# Patient Record
Sex: FEMALE | Race: WHITE | NOT HISPANIC OR LATINO | Employment: OTHER | URBAN - METROPOLITAN AREA
[De-identification: names, ages, dates, MRNs, and addresses within clinical notes are randomized per-mention and may not be internally consistent; named-entity substitution may affect disease eponyms.]

---

## 2022-07-19 ENCOUNTER — TELEPHONE (OUTPATIENT)
Dept: PAIN MEDICINE | Facility: CLINIC | Age: 87
End: 2022-07-19

## 2022-07-22 RX ORDER — AMLODIPINE BESYLATE 10 MG/1
10 TABLET ORAL DAILY
COMMUNITY
Start: 2022-06-18

## 2022-07-22 RX ORDER — HYDROCHLOROTHIAZIDE 25 MG/1
25 TABLET ORAL DAILY
COMMUNITY
Start: 2022-06-18

## 2022-07-25 ENCOUNTER — CONSULT (OUTPATIENT)
Dept: PAIN MEDICINE | Facility: CLINIC | Age: 87
End: 2022-07-25
Payer: COMMERCIAL

## 2022-07-25 VITALS — SYSTOLIC BLOOD PRESSURE: 173 MMHG | HEART RATE: 83 BPM | WEIGHT: 163.8 LBS | DIASTOLIC BLOOD PRESSURE: 79 MMHG

## 2022-07-25 DIAGNOSIS — M50.120 CERVICAL DISC DISORDER WITH RADICULOPATHY OF MID-CERVICAL REGION: ICD-10-CM

## 2022-07-25 DIAGNOSIS — M19.012 PRIMARY OSTEOARTHRITIS OF LEFT SHOULDER: ICD-10-CM

## 2022-07-25 DIAGNOSIS — M48.02 CERVICAL SPINAL STENOSIS: ICD-10-CM

## 2022-07-25 DIAGNOSIS — G89.29 CHRONIC LEFT SHOULDER PAIN: ICD-10-CM

## 2022-07-25 DIAGNOSIS — M75.122 NONTRAUMATIC COMPLETE TEAR OF LEFT ROTATOR CUFF: ICD-10-CM

## 2022-07-25 DIAGNOSIS — G89.4 CHRONIC PAIN SYNDROME: Primary | ICD-10-CM

## 2022-07-25 DIAGNOSIS — M25.512 CHRONIC LEFT SHOULDER PAIN: ICD-10-CM

## 2022-07-25 PROBLEM — M54.12 CERVICAL RADICULOPATHY: Status: ACTIVE | Noted: 2022-01-20

## 2022-07-25 PROBLEM — J45.20 MILD INTERMITTENT ASTHMA WITHOUT COMPLICATION: Status: ACTIVE | Noted: 2017-08-22

## 2022-07-25 PROBLEM — R94.31 PROLONGED QT INTERVAL: Status: ACTIVE | Noted: 2019-04-02

## 2022-07-25 PROBLEM — I10 ESSENTIAL HYPERTENSION: Status: ACTIVE | Noted: 2021-10-06

## 2022-07-25 PROBLEM — Z96.651 H/O TOTAL KNEE REPLACEMENT, RIGHT: Status: ACTIVE | Noted: 2019-03-04

## 2022-07-25 PROCEDURE — 20611 DRAIN/INJ JOINT/BURSA W/US: CPT | Performed by: ANESTHESIOLOGY

## 2022-07-25 PROCEDURE — 99204 OFFICE O/P NEW MOD 45 MIN: CPT | Performed by: ANESTHESIOLOGY

## 2022-07-25 RX ORDER — BUPIVACAINE HYDROCHLORIDE 2.5 MG/ML
10 INJECTION, SOLUTION EPIDURAL; INFILTRATION; INTRACAUDAL ONCE
Status: COMPLETED | OUTPATIENT
Start: 2022-07-25 | End: 2022-07-25

## 2022-07-25 RX ORDER — ALBUTEROL SULFATE 90 UG/1
2 AEROSOL, METERED RESPIRATORY (INHALATION) EVERY 6 HOURS PRN
COMMUNITY
Start: 2021-10-12 | End: 2022-10-12

## 2022-07-25 RX ORDER — ASPIRIN 81 MG/1
1 TABLET ORAL AS NEEDED
COMMUNITY

## 2022-07-25 RX ORDER — METHYLPREDNISOLONE ACETATE 40 MG/ML
40 INJECTION, SUSPENSION INTRA-ARTICULAR; INTRALESIONAL; INTRAMUSCULAR; SOFT TISSUE ONCE
Status: COMPLETED | OUTPATIENT
Start: 2022-07-25 | End: 2022-07-25

## 2022-07-25 RX ADMIN — METHYLPREDNISOLONE ACETATE 40 MG: 40 INJECTION, SUSPENSION INTRA-ARTICULAR; INTRALESIONAL; INTRAMUSCULAR; SOFT TISSUE at 08:34

## 2022-07-25 RX ADMIN — BUPIVACAINE HYDROCHLORIDE 10 ML: 2.5 INJECTION, SOLUTION EPIDURAL; INFILTRATION; INTRACAUDAL at 08:34

## 2022-07-25 NOTE — PROGRESS NOTES
Pre-procedure Diagnosis:  Chronic left shoulder pain    Post-procedure Diagnosis:  Chronic left shoulder pain    Operation Title(s):  Left subacromial bursa injection under ultrasound guidance  Attending Surgeon:   Erin Mondragon MD  Anesthesia:   Local    Indications: The patient is a 81y o  year-old female with a diagnosis of chronic left shoulder pain  The patient's history and physical exam were reviewed  The risks, benefits and alternatives to the procedure were discussed, and all questions were answered to the patient's satisfaction  The patient agreed to proceed, and written informed consent was obtained  Procedure in Detail: The patient was brought into the exam room and placed in the sitting position on the exam room chair  The left shoulder was prepped with chloraprep and draped in a sterile manner  A sterile ultrasound probe cover and gel was placed over a 3 75 MHz curvilinear transducer probe  The probe was placed over the shoulder to visualize the subdeltoid/subacromial bursal region  Optimal needle path was determined and the skin and subcutaneous tissues in the area was anesthetized with 1% lidocaine  Then, under ultrasound guidance, a 2 inch ultrasound needle was advanced until the needle entered the bursa region  After visualization of the tip in the target area and negative aspiration for blood, a solution consisting of 3 mL 0 25% bupivacaine and 1 mL Depo-Medrol (40mg/ml) was easily injected  The patient's shoulder was cleansed and a bandage was placed over the sites of needle insertion  Disposition: The patient tolerated the procedure well and there were no apparent complications  The patient was given written discharge instructions for the procedure

## 2022-07-25 NOTE — PROGRESS NOTES
Assessment  1  Chronic pain syndrome    2  Primary osteoarthritis of left shoulder    3  Chronic left shoulder pain    4  Nontraumatic complete tear of left rotator cuff    5  Cervical spinal stenosis    6  Cervical disc disorder with radiculopathy of mid-cervical region        Plan  The patient's symptoms, history/physical are consistent with pain that is multifactorial in origin but predominantly the result of her severe left shoulder osteoarthritis and rotator cuff tear in conjunction with multilevel cervical spinal stenosis which is leading to left radicular symptoms  At this time, I discussed treatment that will be multimodal in approach  We will perform a left shoulder subacromial bursa injection to help reduce pain from the shoulder joint  Please see the separate procedure note  I did discuss that if she is still symptomatic I will likely recommend repeating the cervical epidural steroid injection but I will use a particulate steroid this time which should provide more long-term relief  Complete risks and benefits including bleeding, infection, tissue reaction, nerve injury and allergic reaction were discussed  The approach was demonstrated using models and literature was provided  Verbal and written consent was obtained  My impressions and treatment recommendations were discussed in detail with the patient who verbalized understanding and had no further questions  Discharge instructions were provided  I personally saw and examined the patient and I agree with the above discussed plan of care  No orders of the defined types were placed in this encounter      New Medications Ordered This Visit   Medications    albuterol (PROVENTIL HFA,VENTOLIN HFA) 90 mcg/act inhaler     Sig: Inhale 2 puffs every 6 (six) hours as needed    aspirin (ECOTRIN LOW STRENGTH) 81 mg EC tablet     Sig: Take 1 tablet by mouth daily    cyanocobalamin (VITAMIN B-12) 1000 MCG tablet     Sig: Take 1 tablet by mouth daily    bupivacaine (PF) (MARCAINE) 0 25 % injection 10 mL    methylPREDNISolone acetate (DEPO-MEDROL) injection 40 mg       History of Present Illness    Melani Peres is a 81 y o  female who presents with chronic left shoulder and arm pain that has been present for 2 years  The patient has been following with Dr Macy Lujan at [facility] and has a known history of severe glenohumeral osteoarthritis as well as complete tear of the left rotator cuff and cervical spinal stenosis Symptoms are severe rated 10/10 on a numeric rating scale and felt constantly  Symptoms include numbness and paresthesias down the entire left arm with weakness  Pain symptoms are aggravated any movement of the left arm  Treatment history has included left shoulder surgery 2 years ago which provided no relief  Nerve injection provided moderate relief  Ice provides moderate relief  Some relief with Tylenol  I have personally reviewed and/or updated the patient's past medical history, past surgical history, family history, social history, current medications, allergies, and vital signs today  Review of Systems   Constitutional: Negative for fever and unexpected weight change  HENT: Negative for trouble swallowing  Eyes: Negative for visual disturbance  Respiratory: Negative for shortness of breath and wheezing  Cardiovascular: Negative for chest pain and palpitations  Gastrointestinal: Negative for constipation, diarrhea, nausea and vomiting  Endocrine: Negative for cold intolerance, heat intolerance and polydipsia  Genitourinary: Negative for difficulty urinating and frequency  Musculoskeletal: Positive for joint swelling  Negative for arthralgias, gait problem and myalgias  Skin: Negative for rash  Neurological: Positive for weakness and numbness  Negative for dizziness, seizures, syncope and headaches  Hematological: Does not bruise/bleed easily     Psychiatric/Behavioral: Negative for dysphoric mood  All other systems reviewed and are negative  Patient Active Problem List   Diagnosis    Cervical radiculopathy    Essential hypertension    Gastroesophageal reflux disease without esophagitis    H/O total knee replacement, right    Hyperlipidemia    Left shoulder pain    Prolonged QT interval    Mild intermittent asthma without complication    Nontraumatic complete tear of left rotator cuff    Chronic pain syndrome       No past medical history on file  No past surgical history on file  No family history on file  Social History     Occupational History    Not on file   Tobacco Use    Smoking status: Not on file    Smokeless tobacco: Not on file   Substance and Sexual Activity    Alcohol use: Not on file    Drug use: Not on file    Sexual activity: Not on file       Current Outpatient Medications on File Prior to Visit   Medication Sig    albuterol (PROVENTIL HFA,VENTOLIN HFA) 90 mcg/act inhaler Inhale 2 puffs every 6 (six) hours as needed    amLODIPine (NORVASC) 10 mg tablet Take 10 mg by mouth daily    hydrochlorothiazide (HYDRODIURIL) 25 mg tablet Take 25 mg by mouth daily    metoprolol tartrate (LOPRESSOR) 25 mg tablet Take 25 mg by mouth 2 (two) times a day    aspirin (ECOTRIN LOW STRENGTH) 81 mg EC tablet Take 1 tablet by mouth daily    cyanocobalamin (VITAMIN B-12) 1000 MCG tablet Take 1 tablet by mouth daily     No current facility-administered medications on file prior to visit  No Known Allergies    Physical Exam    BP (!) 173/79   Pulse 83   Wt 74 3 kg (163 lb 12 8 oz)     Constitutional: normal, well developed, well nourished, alert, in no distress and non-toxic and no overt pain behavior    Eyes: anicteric  HEENT: grossly intact  Neck: supple, symmetric, trachea midline and no masses   Pulmonary:even and unlabored  Cardiovascular:No edema or pitting edema present  Skin:Normal without rashes or lesions and well hydrated  Psychiatric:Mood and affect appropriate  Neurologic:Cranial Nerves II-XII grossly intact  Musculoskeletal:Examination of the left shoulder reveals severe limitation in range of motion in flexion abduction and external rotation with point tenderness over the subacromial bursa region; strength of the left arm reveals weakness of left triceps extension    Imaging      MRI arthrogram of the left shoulder     HISTORY: Left shoulder pain; rule out rotator cuff repair; history of surgery 8   months ago status post distal clavicular excision, subacromial decompression and   open rotator cuff repair in February 15, 2021; symptoms of postoperative pain   and cannot raise her arm; decreased range of motion; r/o RC repair tear     TECHNIQUE: Multiplanar and multisequence MRI images of the left shoulder were   obtained at 1 5 Julia following the administration of intraarticular contrast      COMPARISON: Radiograph of the left shoulder dated 10/7/2021     FINDINGS:     ACROMION OUTLET: Postsurgical changes of acromioplasty and distal clavicular   resection  Extensive fluid in the subacromial subdeltoid bursa extending through   a large defect in the rotator cuff  ROTATOR CUFF: Very large full-thickness complete tear involving the entire width   of the supraspinatus tendon and the majority of the infraspinatus tendon, in   keeping with a recurrent tear adjacent to the postoperative site, extending over   2 8 cm anteroposterior dimension (series 8 image 7 through 11)  Supraspinatus   tendon fibers noted to be medially retracted to the superior medial aspect of   the humeral head, approximately 3 7 cm medial to the greater tuberosity (series   6 image 8)  Moderate to moderate/severe atrophy of the supraspinatus muscle   progressive compared with the prior study  Infraspinatus tendon is retracted to the superior aspect of the humeral head, at   least 2 2 cm medial to the greater tuberosity (series 6/5 image 7)   Mild atrophy   of the infraspinatus muscle, more pronounced compared with the prior study  Interval development of a large new/progressive tears of the subscapularis   tendon  The distal subscapularis tendon fibers are not retracted medially to the   anterior aspect of the glenohumeral joint space, located approximately 3 6 cm   medial to the greater tuberosity (series 13 image 4 through 7), at the site of   previously noted subtle partial tears  Moderate atrophy and fatty infiltration   of the subscapularis muscle, somewhat suboptimal assessment of the field-of-view   images (series 6 image 8 through 11)  Teres minor tendon is intact, and there is no atrophy of the muscles of the   rotator cuff  GLENOHUMERAL JOINT: Moderate to severe glenohumeral arthrosis, with interval   progression, although comparison is somewhat limited due to differences in   technique  Broad-based area of full-thickness/near full-thickness cartilage loss   most pronounced along the superior medial aspect of the humeral head, extending   over approximately 2 5 cm mediolateral dimension (series 6 image 7 through 9),   with very subtle flattening of subchondral sclerosis at the posterior/superior   aspect of the humeral head  Moderate diffuse thinning of the cartilage of the   glenoid, with areas of full-thickness/near full-thickness cartilage loss noted   superiorly, extending over at least 1 cm craniocaudal dimension, with minimal to   mild underlying subchondral edema, better evaluated on the current study  Deformity and truncation with probable postoperative changes of the superior   labrum  Mild irregularity and fraying at the anterior and posterior labrum  BICEPS: Interval development of a complete tear/rupture of the intra-articular   long head biceps tendon which is not identified in the joint space or within the   biceps tendon groove on the inferior most axial images   Small nodular foci of   low signal intensity in the groove likely represent nodular synovitis/debris   (series 3 image 17 through 19)  OSSEOUS STRUCTURES: Postoperative changes with an anchor identified at the   lateral aspect of the humeral head  SOFT TISSUES: Small axillary lymph nodes incidentally noted, not enlarged by   size criteria  IMPRESSION:   IMPRESSION:     1  Moderate to severe glenohumeral arthrosis, with interval progression,   although comparison is somewhat limited due to differences in technique  Broad-based area of full-thickness/near full-thickness cartilage loss most   pronounced along the superior medial aspect of the humeral head, extending over   approximately 2 5 cm mediolateral dimension (series 6 image 7 through 9), with   very subtle flattening of subchondral sclerosis at the posterior/superior aspect   of the humeral head  Moderate diffuse thinning of the cartilage of the glenoid,   with areas of full-thickness/near full-thickness cartilage loss noted   superiorly, extending over at least 1 cm craniocaudal dimension, with minimal to   mild underlying subchondral edema, better evaluated on the current study  2  Large full-thickness recurrent/progressive tears of the rotator cuff, and   progressive muscle atrophy as follows:     Very large full-thickness complete tear involving the entire width of the   supraspinatus tendon and the majority of the infraspinatus tendon, in keeping   with a recurrent tear adjacent to the postoperative site, extending over 2 8 cm   anteroposterior dimension (series 8 image 7 through 11)  Supraspinatus tendon   fibers noted to be medially retracted to the superior medial aspect of the   humeral head, approximately 3 7 cm medial to the greater tuberosity (series 6   image 8)  Moderate to moderate/severe atrophy of the supraspinatus muscle   progressive compared with the prior study       Infraspinatus tendon is retracted to the superior aspect of the humeral head, at   least 2 2 cm medial to the greater tuberosity (series 6/5 image 7)  Mild atrophy   of the infraspinatus muscle, more pronounced compared with the prior study  3  Interval development of a large new/progressive tears of the subscapularis   tendon  The distal subscapularis tendon fibers are not retracted medially to the   anterior aspect of the glenohumeral joint space, located approximately 3 6 cm   medial to the greater tuberosity (series 13 image 4 through 7), at the site of   previously noted subtle partial tears  Moderate atrophy and fatty infiltration   of the subscapularis muscle, somewhat suboptimal assessment of the field-of-view   images (series 6 image 8 through 11)  4  Interval development of a complete tear/rupture of the intra-articular long   head biceps tendon which is not identified in the joint space or within the   biceps tendon groove on the inferior most axial images  Small nodular foci of   low signal intensity in the groove likely represent nodular synovitis/debris   (series 3 image 17 through 19)  5  Postsurgical changes of acromioplasty and distal clavicular resection  Extensive fluid in the subacromial subdeltoid bursa extending through a large   defect in the rotator cuff

## 2022-07-25 NOTE — PATIENT INSTRUCTIONS
Cervical Spinal Stenosis   AMBULATORY CARE:   Cervical spinal stenosis  is narrowing of the spinal canal in your neck  Your spinal canal holds your spinal cord  When your spinal canal narrows, it may put pressure on your spinal cord  Cervical spinal stenosis is a chronic (long-term) condition  Common signs and symptoms: You may have no signs or symptoms  If your spinal canal is very narrow, your signs and symptoms may be worse  You may have any of the following:  Neck pain and headaches    Burning pain that shoots from your shoulder down your arm    Weakness, numbness, or tingling in your arm or hand, which may spread to your legs    Trouble urinating or having a bowel movement    Trouble with balance and walkingTrouble holding your neck up or trouble standing up straight    Seek care immediately:   You injure your neck, and your pain and symptoms worsen  You have new or increased trouble walking  You cannot control when you urinate or have a bowel movement  You have more numbness, tingling, or weakness than before  Contact your healthcare provider if:   Your pain does not get better or gets worse, even after you take medicines  You have questions or concerns about your condition or care  Treatment:   NSAIDs , such as ibuprofen, help decrease swelling and pain  NSAIDs can cause stomach bleeding or kidney problems in certain people  If you take blood thinner medicine, always ask your healthcare provider if NSAIDs are safe for you  Always read the medicine label and follow directions  Acetaminophen  decreases pain and fever  It is available without a doctor's order  Ask how much to take and how often to take it  Follow directions  Read the labels of all other medicines you are using to see if they also contain acetaminophen, or ask your doctor or pharmacist  Acetaminophen can cause liver damage if not taken correctly   Do not use more than 4 grams (4,000 milligrams) total of acetaminophen in one day  Prescription pain medicine  may be given  Ask how to take this medicine safely  Muscle relaxers  help decrease pain and muscle spasms  A steroid injection  may be given to reduce inflammation  Steroid medicine is injected into the epidural space  The epidural space is between your spinal cord and vertebrae  A nerve block  is an injection of numbing medicine  You may need a nerve block if your pain is not going away, or is getting worse  Surgery  may be needed to widen your spinal canal or decrease pressure on your spinal cord  Surgery also may be done to fix damaged or injured vertebrae in your neck  Manage your symptoms:   Go to physical and occupational therapy  as directed  A physical therapist teaches you exercises to help improve movement and strength, and to decrease pain  An occupational therapist teaches you skills to help with your daily activities  Apply heat on your neck for 20 to 30 minutes every 2 hours for as many days as directed  Heat helps decrease pain and muscle spasms  Apply ice  on your neck for 15 to 20 minutes every hour or as directed  Use an ice pack, or put crushed ice in a plastic bag  Cover it with a towel before you apply it to your skin  Ice helps prevent tissue damage and decreases swelling and pain  Avoid certain activities  Some activities may worsen your condition or cause more injury  Do not ride motorcycles or horses, climb ladders, or play football or other contact sports  Ask your provider which activities are safe for you to do  Follow up with your healthcare provider as directed:  Write down your questions so you remember to ask them during your visits  © [employer] 2022 Information is for End User's use only and may not be sold, redistributed or otherwise used for commercial purposes   All illustrations and images included in CareNotes® are the copyrighted property of A D A M [employer] Inc  or [employer]  The above information is an  only  It is not intended as medical advice for individual conditions or treatments  Talk to your doctor, nurse or pharmacist before following any medical regimen to see if it is safe and effective for you

## 2022-08-01 ENCOUNTER — TELEPHONE (OUTPATIENT)
Dept: PAIN MEDICINE | Facility: CLINIC | Age: 87
End: 2022-08-01

## 2022-08-01 NOTE — TELEPHONE ENCOUNTER
**Marcos's name (D-I-L) is not on communication form however [place] D-I-L name is on communication form  **    D-I-L Laineyanna's phone # and pt's phone # are listed as being the same  Unable to leave vm b/c the mailbox is full  Please try calling pt or [facility] later today

## 2022-08-01 NOTE — TELEPHONE ENCOUNTER
Luisana Harmon- daughter in-law  [phone]  Dr Sallie Yen    Patients daughter in-law is calling into schedule another injection for the pt  She would like a neck injection  She said that Dr Sallie Yen said she would need one   Please reach out for scheduling thank you

## 2022-08-03 DIAGNOSIS — M50.120 CERVICAL DISC DISORDER WITH RADICULOPATHY OF MID-CERVICAL REGION: Primary | ICD-10-CM

## 2022-08-03 NOTE — TELEPHONE ENCOUNTER
S/W pt and Marcos on on speaker phone and pt gave permission to s/w her D-I-L Sahil Hurst  Pt had seen FQ on 7/25 and he did an inj into the left shoulder bursa and told her to call if no improvement and he would schedule an inj for her neck  Pt's left shoulder and left arm pain went from 10/10 to 8/10 since the bursa inj, so 20% improvement  She sometimes has neck pain  They would like to schedule that FELIZ that FQ discussed in the office  RN confirmed that pt does not take a daily Baby ASA, uses as needed, she takes tylenol  RN explained that FQ is out of office until 8/16 but I will forward request to his [address] to order the inj  Ondina, pls review FQ consult note that discusses FELIZ if still symptomatic after shoulder bursa inj  Amadeo Castellanos, please call D-I-L Marcos # [phone] when calling to schedule injection for pt  RN received Ok to s/w Marcos per pt

## 2022-08-03 NOTE — TELEPHONE ENCOUNTER
Marcos patient daughter in law called back to schedule procedure  I did advise Marcos not on communication consent form for patient she stated will have Myron call back      Please advise     Patient can be reached at [phone]

## 2022-08-03 NOTE — TELEPHONE ENCOUNTER
Call from [facility] is in \[facility] [place] [facility]\"" and unavailable  Transferred call to nurse

## 2022-08-03 NOTE — TELEPHONE ENCOUNTER
Jeovany Been, please call D-I-L Mary Prieto (I was given verbal permission earlier today by pt) to schedule the injection Emmanuel Gonzalezyumiko ordered  Marcos's # [id number]

## 2022-08-04 NOTE — TELEPHONE ENCOUNTER
Patient scheduled for procedure with Dr Amber Shafer 9/13/22 with an arrival time of 1:40pm    Patient does not have mychart, so the following instructions were given to patient verbally, no blood thinners, or Nsaid, no vaccines 14 days prior or after procedure, nothing to eat or drink 1 hr prior to procedure, wear something loose and comfortable, no jewelry, if ill, infection or antibiotics, must call office to reschedule procedure  Take prescription medications as normal and you will need a  18 yrs or older    Instructions were given to her daughter in law Marcos per her request

## 2022-09-13 ENCOUNTER — HOSPITAL ENCOUNTER (OUTPATIENT)
Dept: RADIOLOGY | Facility: CLINIC | Age: 87
Discharge: HOME | End: 2022-09-13
Payer: COMMERCIAL

## 2022-09-13 VITALS
DIASTOLIC BLOOD PRESSURE: 63 MMHG | OXYGEN SATURATION: 95 % | RESPIRATION RATE: 20 BRPM | HEART RATE: 63 BPM | SYSTOLIC BLOOD PRESSURE: 129 MMHG | TEMPERATURE: 96.5 F

## 2022-09-13 DIAGNOSIS — M50.120 CERVICAL DISC DISORDER WITH RADICULOPATHY OF MID-CERVICAL REGION: ICD-10-CM

## 2022-09-13 PROCEDURE — 62321 NJX INTERLAMINAR CRV/THRC: CPT | Performed by: ANESTHESIOLOGY

## 2022-09-13 RX ORDER — METHYLPREDNISOLONE ACETATE 80 MG/ML
80 INJECTION, SUSPENSION INTRA-ARTICULAR; INTRALESIONAL; INTRAMUSCULAR; PARENTERAL; SOFT TISSUE ONCE
Status: COMPLETED | OUTPATIENT
Start: 2022-09-13 | End: 2022-09-13

## 2022-09-13 RX ADMIN — METHYLPREDNISOLONE ACETATE 80 MG: 80 INJECTION, SUSPENSION INTRA-ARTICULAR; INTRALESIONAL; INTRAMUSCULAR; PARENTERAL; SOFT TISSUE at 14:33

## 2022-09-13 RX ADMIN — IOHEXOL 1 ML: 300 INJECTION, SOLUTION INTRAVENOUS at 14:32

## 2022-09-13 NOTE — DISCHARGE INSTR - LAB
Epidural Steroid Injection   WHAT YOU NEED TO KNOW:   An epidural steroid injection (KARI) is a procedure to inject steroid medicine into the epidural space  The epidural space is between your spinal cord and vertebrae  Steroids reduce inflammation and fluid buildup in your spine that may be causing pain  You may be given pain medicine along with the steroids  ACTIVITY  Do not drive or operate machinery today  No strenuous activity today - bending, lifting, etc   You may resume normal activites starting tomorrow - start slowly and as tolerated  You may shower today, but no tub baths or hot tubs  You may have numbness for several hours from the local anesthetic  Please use caution and common sense, especially with weight-bearing activities  CARE OF THE INJECTION SITE  If you have soreness or pain, apply ice to the area today (20 minutes on/20 minutes off)  Starting tomorrow, you may use warm, moist heat or ice if needed  You may have an increase or change in your discomfort for 36-48 hours after your treatment  Apply ice and continue with any pain medication you have been prescribed  Notify the [facility] if you have any of the following: redness, drainage, swelling, headache, stiff neck or fever above 100°F     SPECIAL INSTRUCTIONS  Our office will contact you in approximately 7 days for a progress report  MEDICATIONS  Continue to take all routine medications  Our office may have instructed you to hold some medications  As no general anesthesia was used in today's procedure, you should not experience any side effects related to anesthesia  If you are diabetic, the steroids used in today's injection may temporarily increase your blood sugar levels after the first few days after your injection  Please keep a close eye on your sugars and alert the doctor who manages your diabetes if your sugars are significantly high from your baseline or you are symptomatic       If you have a problem specifically related to your procedure, please call our office at [phone]  Problems not related to your procedure should be directed to your primary care physician

## 2022-09-13 NOTE — H&P
History of Present Illness: The patient is a 81 y o  female who presents with complaints of neck pain is here today for cervical epidural steroid injection  Patient Active Problem List   Diagnosis    Cervical radiculopathy    Essential hypertension    Gastroesophageal reflux disease without esophagitis    H/O total knee replacement, right    Hyperlipidemia    Left shoulder pain    Prolonged QT interval    Mild intermittent asthma without complication    Nontraumatic complete tear of left rotator cuff    Chronic pain syndrome       No past medical history on file  No past surgical history on file  Current Outpatient Medications:     albuterol (PROVENTIL HFA,VENTOLIN HFA) 90 mcg/act inhaler, Inhale 2 puffs every 6 (six) hours as needed, Disp: , Rfl:     amLODIPine (NORVASC) 10 mg tablet, Take 10 mg by mouth daily, Disp: , Rfl:     aspirin (ECOTRIN LOW STRENGTH) 81 mg EC tablet, Take 1 tablet by mouth if needed , Disp: , Rfl:     cyanocobalamin (VITAMIN B-12) 1000 MCG tablet, Take 1 tablet by mouth daily, Disp: , Rfl:     hydrochlorothiazide (HYDRODIURIL) 25 mg tablet, Take 25 mg by mouth daily, Disp: , Rfl:     metoprolol tartrate (LOPRESSOR) 25 mg tablet, Take 25 mg by mouth 2 (two) times a day, Disp: , Rfl:     No Known Allergies    Physical Exam:   Vitals:    09/13/22 1411   BP: 143/82   Pulse: 58   Resp: 20   Temp: (!) 96 5 °F (35 8 °C)   SpO2: 94%     General: Awake, Alert, Oriented x 3, Mood and affect appropriate  Respiratory: Respirations even and unlabored  Cardiovascular: Peripheral pulses intact; no edema  Musculoskeletal Exam:  Neck pain    ASA Score: 3    Patient/Chart Verification  Patient ID Verified: Verbal  Consents Confirmed: To be obtained in the Pre-Procedure area  Allergies Reviewed: Yes  Anticoag/NSAID held?: Yes (No ASA for a month)  Currently on antibiotics?: No    Assessment:   1   Cervical disc disorder with radiculopathy of mid-cervical region        Plan: FELIZ

## 2022-09-20 ENCOUNTER — TELEPHONE (OUTPATIENT)
Dept: PAIN MEDICINE | Facility: CLINIC | Age: 87
End: 2022-09-20

## 2022-10-19 ENCOUNTER — TELEPHONE (OUTPATIENT)
Dept: PAIN MEDICINE | Facility: CLINIC | Age: 87
End: 2022-10-19

## 2022-10-19 NOTE — TELEPHONE ENCOUNTER
Caller: Patient  Doctor: Dr SUNSHINE    Reason for call: Pt would like to have an injection for her arm pain   Pain level 50     Call back#: [phone]

## 2022-10-20 NOTE — TELEPHONE ENCOUNTER
S/w pt and daughter in law, stated pt continues to have pain and notes very little relief from injection. Reported pain primarily in L shoulder, requested injection. Pain worse with moving arm up and down. Please advise, thank you

## 2022-10-20 NOTE — TELEPHONE ENCOUNTER
Caller: Isidra, daughter    Doctor: osmany    Reason for call: requesting injection status for scheduling    Call back#: [phone] or call patient at [phone]

## 2022-11-07 ENCOUNTER — HOSPITAL ENCOUNTER (OUTPATIENT)
Dept: RADIOLOGY | Facility: HOSPITAL | Age: 87
Discharge: HOME | End: 2022-11-07
Attending: ANESTHESIOLOGY

## 2022-11-07 ENCOUNTER — PROCEDURE VISIT (OUTPATIENT)
Dept: PAIN MEDICINE | Facility: CLINIC | Age: 87
End: 2022-11-07

## 2022-11-07 VITALS — HEART RATE: 78 BPM | SYSTOLIC BLOOD PRESSURE: 166 MMHG | DIASTOLIC BLOOD PRESSURE: 83 MMHG

## 2022-11-07 DIAGNOSIS — M46.1 SACROILIITIS (HCC): ICD-10-CM

## 2022-11-07 DIAGNOSIS — M25.512 CHRONIC LEFT SHOULDER PAIN: Primary | ICD-10-CM

## 2022-11-07 DIAGNOSIS — G89.29 CHRONIC LEFT SHOULDER PAIN: Primary | ICD-10-CM

## 2022-11-07 RX ORDER — BUPIVACAINE HYDROCHLORIDE 2.5 MG/ML
10 INJECTION, SOLUTION EPIDURAL; INFILTRATION; INTRACAUDAL ONCE
Status: COMPLETED | OUTPATIENT
Start: 2022-11-07 | End: 2022-11-07

## 2022-11-07 RX ORDER — METHYLPREDNISOLONE ACETATE 40 MG/ML
40 INJECTION, SUSPENSION INTRA-ARTICULAR; INTRALESIONAL; INTRAMUSCULAR; SOFT TISSUE ONCE
Status: COMPLETED | OUTPATIENT
Start: 2022-11-07 | End: 2022-11-07

## 2022-11-07 RX ADMIN — BUPIVACAINE HYDROCHLORIDE 10 ML: 2.5 INJECTION, SOLUTION EPIDURAL; INFILTRATION; INTRACAUDAL at 09:00

## 2022-11-07 RX ADMIN — METHYLPREDNISOLONE ACETATE 40 MG: 40 INJECTION, SUSPENSION INTRA-ARTICULAR; INTRALESIONAL; INTRAMUSCULAR; SOFT TISSUE at 09:00

## 2022-11-07 NOTE — PROGRESS NOTES
Pre-procedure Diagnosis:  Chronic left shoulder pain    Post-procedure Diagnosis:  Chronic left shoulder pain    Operation Title(s):  Left subacromial bursa injection under ultrasound guidance  Attending Surgeon:   Clover Doan MD  Anesthesia:   Local    Indications: The patient is a 81y o  year-old female with a diagnosis of chronic left shoulder pain    The patient's history and physical exam were reviewed  The risks, benefits and alternatives to the procedure were discussed, and all questions were answered to the patient's satisfaction  The patient agreed to proceed, and written informed consent was obtained  Procedure in Detail: The patient was brought into the exam room and placed in the sitting position on the exam room chair  The left shoulder was prepped with chloraprep and draped in a sterile manner  A sterile ultrasound probe cover and gel was placed over a 3 75 MHz curvilinear transducer probe  The probe was placed over the shoulder to visualize the subdeltoid/subacromial bursal region  Optimal needle path was determined and the skin and subcutaneous tissues in the area was anesthetized with 1% lidocaine  Then, under ultrasound guidance, a 2 inch ultrasound needle was advanced until the needle entered the bursa region  After visualization of the tip in the target area and negative aspiration for blood, a solution consisting of 3 mL 0 25% bupivacaine and 1 mL Depo-Medrol (40mg/ml) was easily injected  The patient's shoulder was cleansed and a bandage was placed over the sites of needle insertion  Disposition: The patient tolerated the procedure well and there were no apparent complications  The patient was given written discharge instructions for the procedure

## 2022-11-11 ENCOUNTER — TELEPHONE (OUTPATIENT)
Dept: PAIN MEDICINE | Facility: CLINIC | Age: 87
End: 2022-11-11

## 2022-11-11 NOTE — TELEPHONE ENCOUNTER
Please let patient know that x-ray of the pelvis did show arthritic changes in the sacroiliac joints which would cause her lower back and groin pain    If she is still symptomatic, can proceed with sacroiliac joint injection    Please also get update on how the shoulder is feeling

## 2022-11-14 ENCOUNTER — TELEPHONE (OUTPATIENT)
Dept: PAIN MEDICINE | Facility: CLINIC | Age: 87
End: 2022-11-14

## 2022-12-09 ENCOUNTER — TELEPHONE (OUTPATIENT)
Dept: PAIN MEDICINE | Facility: CLINIC | Age: 87
End: 2022-12-09

## 2022-12-09 NOTE — TELEPHONE ENCOUNTER
Patient scheduled for procedure with Dr Akilah Goldberg on 1/5/23 at 1:40pm    Patient does not have mychart, so the following instructions were given to patient verbally, no vaccines 14 days prior or after procedure, nothing to eat or drink 1 hr prior to procedure, wear something loose and comfortable, no jewelry, if ill, infection or antibiotics, must call office to reschedule procedure  Take prescription medications as normal and you will need a  18 yrs or older

## 2023-01-05 ENCOUNTER — HOSPITAL ENCOUNTER (OUTPATIENT)
Dept: RADIOLOGY | Facility: CLINIC | Age: 88
Discharge: HOME | End: 2023-01-05
Admitting: ANESTHESIOLOGY

## 2023-01-05 VITALS
OXYGEN SATURATION: 98 % | DIASTOLIC BLOOD PRESSURE: 54 MMHG | HEART RATE: 88 BPM | TEMPERATURE: 99.3 F | RESPIRATION RATE: 20 BRPM | SYSTOLIC BLOOD PRESSURE: 100 MMHG

## 2023-01-05 DIAGNOSIS — M46.1 SACROILIITIS (HCC): ICD-10-CM

## 2023-01-05 RX ORDER — METHYLPREDNISOLONE ACETATE 80 MG/ML
80 INJECTION, SUSPENSION INTRA-ARTICULAR; INTRALESIONAL; INTRAMUSCULAR; PARENTERAL; SOFT TISSUE ONCE
Status: COMPLETED | OUTPATIENT
Start: 2023-01-05 | End: 2023-01-05

## 2023-01-05 RX ORDER — BUPIVACAINE HCL/PF 2.5 MG/ML
3 VIAL (ML) INJECTION ONCE
Status: COMPLETED | OUTPATIENT
Start: 2023-01-05 | End: 2023-01-05

## 2023-01-05 RX ORDER — 0.9 % SODIUM CHLORIDE 0.9 %
2 VIAL (ML) INJECTION ONCE
Status: COMPLETED | OUTPATIENT
Start: 2023-01-05 | End: 2023-01-05

## 2023-01-05 RX ADMIN — BUPIVACAINE HYDROCHLORIDE 3 ML: 2.5 INJECTION, SOLUTION EPIDURAL; INFILTRATION; INTRACAUDAL at 14:42

## 2023-01-05 RX ADMIN — SODIUM CHLORIDE 2 ML: 9 INJECTION INTRAMUSCULAR; INTRAVENOUS; SUBCUTANEOUS at 14:42

## 2023-01-05 RX ADMIN — IOHEXOL 1 ML: 300 INJECTION, SOLUTION INTRAVENOUS at 14:42

## 2023-01-05 RX ADMIN — Medication 2 ML: at 14:42

## 2023-01-05 RX ADMIN — METHYLPREDNISOLONE ACETATE 80 MG: 80 INJECTION, SUSPENSION INTRA-ARTICULAR; INTRALESIONAL; INTRAMUSCULAR; PARENTERAL; SOFT TISSUE at 14:42

## 2023-01-05 NOTE — DISCHARGE INSTRUCTIONS
Steroid Joint Injection   WHAT YOU NEED TO KNOW:   A steroid joint injection is a procedure to inject steroid medicine into a joint  Steroid medicine decreases pain and inflammation  The injection may also contain an anesthetic (numbing medicine) to decrease pain  It may be done to treat conditions such as arthritis, gout, or carpal tunnel syndrome  The injections may be given in your knee, ankle, shoulder, elbow, wrist, ankle or sacroiliac joint  Do not apply heat to any area that is numb  If you have discomfort or soreness at the injection site, you may apply ice today, 20 minutes on and 20 minutes off  Tomorrow you may use ice or warm, moist heat  Do not apply ice or heat directly to the skin  You may have an increase or change in the discomfort for 36-48 hours after your treatment  Apply ice and continue with any pain medicine you have been prescribed  Do not do anything strenuous today  You may shower, but no tub baths or hot tubs today  You may resume your normal activities tomorrow, but do not “overdo it”  Resume normal activities slowly when you are feeling better  If you experience redness, drainage or swelling at the injection site, or if you develop a fever above 100 degrees, please call [facility] at [phone] or go to the Emergency Room  Continue to take all routine medicines prescribed by your primary care physician unless otherwise instructed by our staff  Most blood thinners should be started again according to your regularly scheduled dosing  If you have any questions, please give our office a call  As no general anesthesia was used in today's procedure, you should not experience any side effects related to anesthesia  If you are diabetic, the steroids used in today's injection may temporarily increase your blood sugar levels after the first few days after your injection   Please keep a close eye on your sugars and alert the doctor who manages your diabetes if your sugars are significantly high from your baseline or you are symptomatic  If you have a problem specifically related to your procedure, please call our office at [phone]  Problems not related to your procedure should be directed to your primary care physician

## 2023-01-05 NOTE — H&P
History of Present Illness: The patient is a 81 y o  female who presents with complaints of lower back pain and is here today for bilateral sacroiliac joint injections    No past medical history on file  No past surgical history on file  Current Outpatient Medications:   •  amLODIPine (NORVASC) 10 mg tablet, Take 10 mg by mouth daily, Disp: , Rfl:   •  aspirin (ECOTRIN LOW STRENGTH) 81 mg EC tablet, Take 1 tablet by mouth if needed , Disp: , Rfl:   •  cyanocobalamin (VITAMIN B-12) 1000 MCG tablet, Take 1 tablet by mouth daily, Disp: , Rfl:   •  hydrochlorothiazide (HYDRODIURIL) 25 mg tablet, Take 25 mg by mouth daily, Disp: , Rfl:   •  metoprolol tartrate (LOPRESSOR) 25 mg tablet, Take 25 mg by mouth 2 (two) times a day, Disp: , Rfl:     Current Facility-Administered Medications:   •  bupivacaine (PF) (MARCAINE) 0 25 % injection 3 mL, 3 mL, Intra-articular, Once, Hernán Gordon MD  •  iohexol (OMNIPAQUE) 300 mg/mL injection 1 mL, 1 mL, Intra-articular, Once, Hernán Gordon MD  •  lidocaine (PF) (XYLOCAINE-MPF) 2 % injection 2 mL, 2 mL, Infiltration, Once, Hernán Gordon MD  •  methylPREDNISolone acetate (DEPO-MEDROL) injection 80 mg, 80 mg, Intra-articular, Once, Hernán Gordon MD  •  sodium chloride (PF) 0 9 % injection 2 mL, 2 mL, Infiltration, Once, Hernán Gordon MD    No Known Allergies    Physical Exam:   Vitals:    01/05/23 1357   BP: 118/68   Pulse: 67   Resp: 18   Temp: 99 3 °F (37 4 °C)   SpO2: 95%     General: Awake, Alert, Oriented x 3, Mood and affect appropriate  Respiratory: Respirations even and unlabored  Cardiovascular: Peripheral pulses intact; no edema  Musculoskeletal Exam: Back pain    ASA Score: 3    Patient/Chart Verification  Patient ID Verified: Verbal  ID Band Applied: No  Consents Confirmed: Procedural  H&P( within 30 days) Verified: To be obtained in the Pre-Procedure area  Interval H&P(within 24 hr) Complete (required for Outpatients and Surgery Admit only):  To be obtained in the Pre-Procedure area  Allergies Reviewed: Yes  Anticoag/NSAID held?: NA  Currently on antibiotics?: No  Pre-op Lab/Test Results Available: N/A  Pregnancy Lab Collected: N/A comment    Assessment:   1   Sacroiliitis ([facility] [address] )        Plan: Bilateral sacroiliac joint injections

## 2023-01-12 ENCOUNTER — TELEPHONE (OUTPATIENT)
Dept: PAIN MEDICINE | Facility: CLINIC | Age: 88
End: 2023-01-12

## 2023-02-08 ENCOUNTER — PROCEDURE VISIT (OUTPATIENT)
Dept: PAIN MEDICINE | Facility: CLINIC | Age: 88
End: 2023-02-08

## 2023-02-08 VITALS — HEART RATE: 90 BPM | SYSTOLIC BLOOD PRESSURE: 146 MMHG | DIASTOLIC BLOOD PRESSURE: 83 MMHG

## 2023-02-08 DIAGNOSIS — G89.29 CHRONIC LEFT SHOULDER PAIN: Primary | ICD-10-CM

## 2023-02-08 DIAGNOSIS — M51.16 INTERVERTEBRAL DISC DISORDER WITH RADICULOPATHY OF LUMBAR REGION: ICD-10-CM

## 2023-02-08 DIAGNOSIS — M25.512 CHRONIC LEFT SHOULDER PAIN: Primary | ICD-10-CM

## 2023-02-08 RX ORDER — BUPIVACAINE HYDROCHLORIDE 2.5 MG/ML
10 INJECTION, SOLUTION EPIDURAL; INFILTRATION; INTRACAUDAL ONCE
Status: COMPLETED | OUTPATIENT
Start: 2023-02-08 | End: 2023-02-08

## 2023-02-08 RX ORDER — METHYLPREDNISOLONE ACETATE 40 MG/ML
40 INJECTION, SUSPENSION INTRA-ARTICULAR; INTRALESIONAL; INTRAMUSCULAR; SOFT TISSUE ONCE
Status: COMPLETED | OUTPATIENT
Start: 2023-02-08 | End: 2023-02-08

## 2023-02-08 RX ORDER — CELECOXIB 200 MG/1
200 CAPSULE ORAL 2 TIMES DAILY
COMMUNITY
Start: 2022-10-06

## 2023-02-08 RX ORDER — ATORVASTATIN CALCIUM 40 MG/1
1 TABLET, FILM COATED ORAL EVERY EVENING
COMMUNITY
Start: 2022-11-28

## 2023-02-08 RX ADMIN — METHYLPREDNISOLONE ACETATE 40 MG: 40 INJECTION, SUSPENSION INTRA-ARTICULAR; INTRALESIONAL; INTRAMUSCULAR; SOFT TISSUE at 11:21

## 2023-02-08 RX ADMIN — BUPIVACAINE HYDROCHLORIDE 10 ML: 2.5 INJECTION, SOLUTION EPIDURAL; INFILTRATION; INTRACAUDAL at 11:20

## 2023-02-08 NOTE — PROGRESS NOTES
Pre-procedure Diagnosis:  Chronic left shoulder pain     Post-procedure Diagnosis:  Chronic left shoulder pain     Operation Title(s):  Left subacromial bursa injection under ultrasound guidance  Attending Surgeon:   Mariel Box MD  Anesthesia:   Local     Indications: The patient is a 81y o  year-old female with a diagnosis of chronic left shoulder pain    The patient's history and physical exam were reviewed  The risks, benefits and alternatives to the procedure were discussed, and all questions were answered to the patient's satisfaction  The patient agreed to proceed, and written informed consent was obtained      Procedure in Detail: The patient was brought into the exam room and placed in the sitting position on the exam room chair  The left shoulder was prepped with chloraprep and draped in a sterile manner       A sterile ultrasound probe cover and gel was placed over a 3 75 MHz curvilinear transducer probe  The probe was placed over the shoulder to visualize the subdeltoid/subacromial bursal region  Optimal needle path was determined and the skin and subcutaneous tissues in the area was anesthetized with 1% lidocaine  Then, under ultrasound guidance, a 2 inch ultrasound needle was advanced until the needle entered the bursa region  After visualization of the tip in the target area and negative aspiration for blood, a solution consisting of 3 mL 0 25% bupivacaine and 1 mL Depo-Medrol (40mg/ml) was easily injected      The patient's shoulder was cleansed and a bandage was placed over the sites of needle insertion      Disposition: The patient tolerated the procedure well and there were no apparent complications  The patient was given written discharge instructions for the procedure

## 2023-02-15 ENCOUNTER — TELEPHONE (OUTPATIENT)
Dept: PAIN MEDICINE | Facility: CLINIC | Age: 88
End: 2023-02-15

## 2023-02-15 ENCOUNTER — HOSPITAL ENCOUNTER (OUTPATIENT)
Dept: RADIOLOGY | Age: 88
Discharge: HOME | End: 2023-02-15

## 2023-02-15 DIAGNOSIS — M51.16 INTERVERTEBRAL DISC DISORDER WITH RADICULOPATHY OF LUMBAR REGION: ICD-10-CM

## 2023-02-22 ENCOUNTER — TELEPHONE (OUTPATIENT)
Dept: PAIN MEDICINE | Facility: CLINIC | Age: 88
End: 2023-02-22

## 2023-02-22 DIAGNOSIS — M51.16 INTERVERTEBRAL DISC DISORDER WITH RADICULOPATHY OF LUMBAR REGION: Primary | ICD-10-CM

## 2023-02-22 NOTE — TELEPHONE ENCOUNTER
Pt informed of MRI results  Pt said her pain is the worse in the mornings, 10/10  She has LBP, left worse than right, pain of hips and off and on pain of her lower legs  She denies and N&T  She uses aleve, ibuprofen or tylenol but it only lowers her pain to a 8/10  She takes celebrex sometimes but no daily which helps her shoulders  Pt would be willing to do the injection   Any recommendation for pain other than what she is taking now until inj?

## 2023-02-22 NOTE — TELEPHONE ENCOUNTER
Caller: Melani (PT)    Doctor/Office: Dr Amber Shafer     Call regarding :  MRI results     Call was transferred to: Nurse

## 2023-02-22 NOTE — TELEPHONE ENCOUNTER
Please let patient know that MRI lumbar spine shows multilevel degenerative changes and stenosis worst at L3-4  Please get update on current symptoms  Will likely recommend proceeding with bilateral L3 transforaminal epidural steroid injection

## 2023-02-24 NOTE — TELEPHONE ENCOUNTER
S/w pt's daughter-in-law, Ace Maciel, w/ permission from pt in prior tasks  RN advised DIL to update [facility] med comm for further comm approval  RN advised DIL of same and that proc schd will reach out for schd of indicated proc below  RN provided MRI results to DIL  Pts DIL apprec of c/b and verbalized understanding  Schd --> pls schd pt accordingly  Thank you!

## 2023-04-06 ENCOUNTER — TELEPHONE (OUTPATIENT)
Dept: PAIN MEDICINE | Facility: CLINIC | Age: 88
End: 2023-04-06

## 2023-04-06 NOTE — TELEPHONE ENCOUNTER
Tried to phone patient regarding her procedure 4/13/23  Left message to call office, procedure must be cancelled until she has a re-eval with CRNP to document Lumbar exam and any active conservative treatment

## 2023-05-02 ENCOUNTER — OFFICE VISIT (OUTPATIENT)
Dept: PAIN MEDICINE | Facility: CLINIC | Age: 88
End: 2023-05-02

## 2023-05-02 VITALS — SYSTOLIC BLOOD PRESSURE: 148 MMHG | DIASTOLIC BLOOD PRESSURE: 68 MMHG | HEART RATE: 85 BPM

## 2023-05-02 DIAGNOSIS — M48.061 SPINAL STENOSIS OF LUMBAR REGION, UNSPECIFIED WHETHER NEUROGENIC CLAUDICATION PRESENT: ICD-10-CM

## 2023-05-02 DIAGNOSIS — G89.4 CHRONIC PAIN SYNDROME: Primary | ICD-10-CM

## 2023-05-02 DIAGNOSIS — M51.16 INTERVERTEBRAL DISC DISORDER WITH RADICULOPATHY OF LUMBAR REGION: ICD-10-CM

## 2023-05-02 NOTE — PROGRESS NOTES
Assessment:  1  Chronic pain syndrome    2  Intervertebral disc disorder with radiculopathy of lumbar region    3  Spinal stenosis of lumbar region, unspecified whether neurogenic claudication present        Plan:  The patient is an 23-year-old female with a history of chronic pain secondary to neck pain, cervical radiculopathy, cervical spinal stenosis, left shoulder arthritis, low back pain and lumbar radiculopathy who presents to the office with worsening bilateral low back pain and pain that radiates into bilateral lower extremities, right worse than left  MRI of lumbar spine shows multilevel degenerative changes and stenosis that is worse at L3-L4  At this time, I advised patient to proceed with bilateral L3 TFESI to decrease inflammation and provide her relief  Patient would like to proceed  I instructed our  will schedule her  Complete risks and benefits including bleeding, infection, tissue reaction, nerve injury and allergic reaction were discussed  The approach was demonstrated using models and literature was provided  Verbal and written consent was obtained  My impressions and treatment recommendations were discussed in detail with the patient who verbalized understanding and had no further questions  Discharge instructions were provided  I personally saw and examined the patient and I agree with the above discussed plan of care  No orders of the defined types were placed in this encounter  No orders of the defined types were placed in this encounter  History of Present Illness:  Francenia Cabot is a 81 y o  female with a history of chronic pain secondary to neck pain, cervical radiculopathy, cervical spinal stenosis, left shoulder arthritis, low back pain and lumbar radiculopathy  She was last seen on 2/8/2023 where she underwent a left subacromial bursa injection which provided her greater than 50% relief of her left shoulder pain    She presents to the office with worsening bilateral low back pain and pain that radiates into bilateral lower extremities, right worse than left  She states her pain is worse since the last office visit and constant  She is currently rating her pain a 10/10 on a numeric scale  Current pain medications include Celebrex which is providing little relief  I have personally reviewed and/or updated the patient's past medical history, past surgical history, family history, social history, current medications, allergies, and vital signs today  Review of Systems    Patient Active Problem List   Diagnosis    Cervical radiculopathy    Essential hypertension    Gastroesophageal reflux disease without esophagitis    H/O total knee replacement, right    Hyperlipidemia    Left shoulder pain    Prolonged QT interval    Mild intermittent asthma without complication    Nontraumatic complete tear of left rotator cuff    Chronic pain syndrome    Cervical disc disorder with radiculopathy of mid-cervical region    Sacroiliitis ([facility] [address] )       No past medical history on file  No past surgical history on file  No family history on file      Social History     Occupational History    Not on file   Tobacco Use    Smoking status: Not on file    Smokeless tobacco: Not on file   Substance and Sexual Activity    Alcohol use: Not on file    Drug use: Not on file    Sexual activity: Not on file       Current Outpatient Medications on File Prior to Visit   Medication Sig    amLODIPine (NORVASC) 10 mg tablet Take 10 mg by mouth daily    aspirin (ECOTRIN LOW STRENGTH) 81 mg EC tablet Take 1 tablet by mouth if needed     atorvastatin (LIPITOR) 40 mg tablet Take 1 tablet by mouth every evening    celecoxib (CeleBREX) 200 mg capsule Take 200 mg by mouth 2 (two) times a day    cyanocobalamin (VITAMIN B-12) 1000 MCG tablet Take 1 tablet by mouth daily    hydrochlorothiazide (HYDRODIURIL) 25 mg tablet Take 25 mg by mouth daily    metoprolol tartrate (LOPRESSOR) 25 mg tablet Take 25 mg by mouth 2 (two) times a day     No current facility-administered medications on file prior to visit  No Known Allergies    Physical Exam:    /68   Pulse 85     Constitutional:normal, well developed, well nourished, alert, in no distress and non-toxic and no overt pain behavior    Eyes:anicteric  HEENT:grossly intact  Neck:supple, symmetric, trachea midline and no masses   Pulmonary:even and unlabored  Cardiovascular:No edema or pitting edema present  Skin:Normal without rashes or lesions and well hydrated  Psychiatric:Mood and affect appropriate  Neurologic:Cranial Nerves II-XII grossly intact  Musculoskeletal:antalgic     Lumbar Spine Exam    Appearance:  Normal lordosis  Palpation/Tenderness:  left lumbar paraspinal tenderness  right lumbar paraspinal tenderness  Sensory:  no sensory deficits noted  Range of Motion:  Flexion:  Minimally limited  with pain  Extension:  Minimally limited  with pain  Motor Strength:  Left hip flexion:  5/5  Right hip flexion:  4/5  Left knee flexion:  5/5  Left knee extension:  5/5  Right knee flexion:  4/5  Right knee extension:  4/5  Left foot dorsiflexion:  5/5  Left foot plantar flexion:  5/5  Right foot dorsiflexion:  4/5  Right foot plantar flexion:  4/5    Imaging

## 2023-05-04 ENCOUNTER — TELEPHONE (OUTPATIENT)
Dept: PAIN MEDICINE | Facility: CLINIC | Age: 88
End: 2023-05-04

## 2023-05-04 NOTE — TELEPHONE ENCOUNTER
As per office visit note on 5/2/23, patient's pain scale rating is 10/10  Patient has not been able to do formal physical therapy due to her pain  She suffers from cervical, low back, lumbar ans shoulder pain, it is extremely difficult for her to attempt any physical exercise at this time  She is hoping to get some relief for her lumbar and low back pain from the epidural steroid injection

## 2023-05-26 ENCOUNTER — TELEPHONE (OUTPATIENT)
Dept: PAIN MEDICINE | Facility: CLINIC | Age: 88
End: 2023-05-26

## 2023-05-26 ENCOUNTER — PROCEDURE VISIT (OUTPATIENT)
Dept: PAIN MEDICINE | Facility: CLINIC | Age: 88
End: 2023-05-26

## 2023-05-26 VITALS — DIASTOLIC BLOOD PRESSURE: 73 MMHG | HEART RATE: 72 BPM | SYSTOLIC BLOOD PRESSURE: 146 MMHG

## 2023-05-26 DIAGNOSIS — M51.16 INTERVERTEBRAL DISC DISORDER WITH RADICULOPATHY OF LUMBAR REGION: Primary | ICD-10-CM

## 2023-05-26 DIAGNOSIS — M75.52 BURSITIS OF LEFT SHOULDER: Primary | ICD-10-CM

## 2023-05-26 DIAGNOSIS — G89.4 CHRONIC PAIN SYNDROME: ICD-10-CM

## 2023-05-26 RX ORDER — METHYLPREDNISOLONE ACETATE 40 MG/ML
40 INJECTION, SUSPENSION INTRA-ARTICULAR; INTRALESIONAL; INTRAMUSCULAR; SOFT TISSUE ONCE
Status: COMPLETED | OUTPATIENT
Start: 2023-05-26 | End: 2023-05-26

## 2023-05-26 RX ORDER — BUPIVACAINE HYDROCHLORIDE 2.5 MG/ML
10 INJECTION, SOLUTION EPIDURAL; INFILTRATION; INTRACAUDAL ONCE
Status: COMPLETED | OUTPATIENT
Start: 2023-05-26 | End: 2023-05-26

## 2023-05-26 RX ORDER — TRAMADOL HYDROCHLORIDE 50 MG/1
50 TABLET ORAL DAILY PRN
Qty: 30 TABLET | Refills: 0 | Status: SHIPPED | OUTPATIENT
Start: 2023-05-26

## 2023-05-26 RX ADMIN — METHYLPREDNISOLONE ACETATE 40 MG: 40 INJECTION, SUSPENSION INTRA-ARTICULAR; INTRALESIONAL; INTRAMUSCULAR; SOFT TISSUE at 08:50

## 2023-05-26 RX ADMIN — BUPIVACAINE HYDROCHLORIDE 10 ML: 2.5 INJECTION, SOLUTION EPIDURAL; INFILTRATION; INTRACAUDAL at 08:50

## 2023-05-26 NOTE — PROGRESS NOTES
Pre-procedure Diagnosis:   1  Bursitis of left shoulder      Post-procedure Diagnosis:   1  Bursitis of left shoulder      Operation Title(s): Left subacromial bursa injection under ultrasound guidance  Attending Surgeon:   Rex Jones MD  Anesthesia:   Local    Indications: The patient is a 81y o  year-old female with a diagnosis of   1  Bursitis of left shoulder      The patient's history and physical exam were reviewed  The risks, benefits and alternatives to the procedure were discussed, and all questions were answered to the patient's satisfaction  The patient agreed to proceed, and written informed consent was obtained  Procedure in Detail: The patient was brought into the exam room and placed in the sitting position on the exam room chair  The left shoulder was prepped with chloraprep and draped in a sterile manner  A sterile ultrasound probe cover and gel was placed over a 3 75 MHz curvilinear transducer probe  The probe was placed over the shoulder to visualize the subdeltoid/subacromial bursal region  Optimal needle path was determined and the skin and subcutaneous tissues in the area was anesthetized with 1% lidocaine  Then, under ultrasound guidance, a 2 inch ultrasound needle was advanced until the needle entered the bursa region  After visualization of the tip in the target area and negative aspiration for blood, a solution consisting of 3 mL 0 25% bupivacaine and 1 mL Depo-Medrol (40mg/ml) was easily injected  The patient's shoulder was cleansed and a bandage was placed over the sites of needle insertion  Disposition: The patient tolerated the procedure well and there were no apparent complications  The patient was given written discharge instructions for the procedure

## 2023-06-02 ENCOUNTER — TELEPHONE (OUTPATIENT)
Dept: OBGYN CLINIC | Facility: HOSPITAL | Age: 88
End: 2023-06-02

## 2023-06-02 NOTE — TELEPHONE ENCOUNTER
I spoke with the patient's daughter in law Merle Interiano), Patient fell the following day after procedure, she is currently hospitalized as she broke her femur and required emergency surgery  Was unable to obtain pain scale and % of improvement at this time  Patient's daughter in law requested all appointments be cancelled at this time  Please advise next steps for this patient and her family

## 2023-06-05 NOTE — TELEPHONE ENCOUNTER
RN s/w D-I-L Candance Divers (ok per comm form) and advised that per FQ once she has recovered from her femur surgery they can make f/u appt w/ Michelle Aguilar or FQ  Candance Divers said that they also received a call directly from [address] [place] [address] since Friday to ask how she was doing  D-I-L very appreciative of both calls

## 2023-10-03 ENCOUNTER — TELEPHONE (OUTPATIENT)
Dept: PAIN MEDICINE | Facility: CLINIC | Age: 88
End: 2023-10-03

## 2023-10-03 NOTE — TELEPHONE ENCOUNTER
Caller: Jeanne Alaniz     Doctor: Malachi Troy     Reason for call: calling to reschedule procedure on 10/20 at 8:15 am     Call back#: [phone]

## 2023-11-06 ENCOUNTER — PROCEDURE VISIT (OUTPATIENT)
Dept: PAIN MEDICINE | Facility: CLINIC | Age: 88
End: 2023-11-06
Payer: COMMERCIAL

## 2023-11-06 VITALS — SYSTOLIC BLOOD PRESSURE: 147 MMHG | DIASTOLIC BLOOD PRESSURE: 72 MMHG | HEART RATE: 62 BPM

## 2023-11-06 DIAGNOSIS — M25.512 CHRONIC LEFT SHOULDER PAIN: Primary | ICD-10-CM

## 2023-11-06 DIAGNOSIS — G89.29 CHRONIC LEFT SHOULDER PAIN: Primary | ICD-10-CM

## 2023-11-06 PROCEDURE — 20611 DRAIN/INJ JOINT/BURSA W/US: CPT | Performed by: ANESTHESIOLOGY

## 2023-11-06 RX ORDER — BUPIVACAINE HYDROCHLORIDE 2.5 MG/ML
10 INJECTION, SOLUTION EPIDURAL; INFILTRATION; INTRACAUDAL ONCE
Status: COMPLETED | OUTPATIENT
Start: 2023-11-06 | End: 2023-11-06

## 2023-11-06 RX ORDER — METHYLPREDNISOLONE ACETATE 40 MG/ML
40 INJECTION, SUSPENSION INTRA-ARTICULAR; INTRALESIONAL; INTRAMUSCULAR; SOFT TISSUE ONCE
Status: COMPLETED | OUTPATIENT
Start: 2023-11-06 | End: 2023-11-06

## 2023-11-06 RX ADMIN — METHYLPREDNISOLONE ACETATE 40 MG: 40 INJECTION, SUSPENSION INTRA-ARTICULAR; INTRALESIONAL; INTRAMUSCULAR; SOFT TISSUE at 14:00

## 2023-11-06 RX ADMIN — BUPIVACAINE HYDROCHLORIDE 10 ML: 2.5 INJECTION, SOLUTION EPIDURAL; INFILTRATION; INTRACAUDAL at 14:09

## 2023-11-06 NOTE — PROGRESS NOTES
Pre-procedure Diagnosis:   1. Chronic left shoulder pain      Post-procedure Diagnosis:   1. Chronic left shoulder pain      Operation Title(s):  Left subacromial bursa injection under ultrasound guidance  Attending Surgeon:   Darleen Hewitt MD  Anesthesia:   Local    Indications: The patient is a 81y.o. year-old female with a diagnosis of   1. Chronic left shoulder pain    . The patient's history and physical exam were reviewed. The risks, benefits and alternatives to the procedure were discussed, and all questions were answered to the patient's satisfaction. The patient agreed to proceed, and written informed consent was obtained. Procedure in Detail: The patient was brought into the exam room and placed in the sitting position on the exam room chair. The left shoulder was prepped with chloraprep and draped in a sterile manner. A sterile ultrasound probe cover and gel was placed over a 3.75 MHz curvilinear transducer probe. The probe was placed over the shoulder to visualize the subdeltoid/subacromial bursal region. Optimal needle path was determined and the skin and subcutaneous tissues in the area was anesthetized with 1% lidocaine. Then, under ultrasound guidance, a 2 inch ultrasound needle was advanced until the needle entered the bursa region. After visualization of the tip in the target area and negative aspiration for blood, a solution consisting of 3 mL 0.25% bupivacaine and 1 mL Depo-Medrol (40mg/ml) was easily injected. The patient's shoulder was cleansed and a bandage was placed over the sites of needle insertion. Disposition: The patient tolerated the procedure well and there were no apparent complications. The patient was given written discharge instructions for the procedure.

## 2023-11-13 ENCOUNTER — TELEPHONE (OUTPATIENT)
Dept: RADIOLOGY | Facility: MEDICAL CENTER | Age: 88
End: 2023-11-13

## 2023-11-30 ENCOUNTER — TELEPHONE (OUTPATIENT)
Age: 88
End: 2023-11-30

## 2023-11-30 NOTE — TELEPHONE ENCOUNTER
Caller: renan Jimenez    Doctor: Flavio Anaya    Reason for call: pt calling to schedule a procedure    Call back#: [phone]

## 2023-11-30 NOTE — TELEPHONE ENCOUNTER
Pt said when she saw FQ on 11/6/23 for her shoulder inj they talked about her LB pain. He told her someone would call her but no one has. She is asking for an injection for B/L LBP that sometimes spreads down the back of her left leg. Pls review and advise.

## 2023-12-04 NOTE — TELEPHONE ENCOUNTER
Please apologize to her for me. I would like to do a lumbar epidural steroid injection after reviewing her images. She was hesitant to do that before.   If she is willing to proceed, I would do an L5 LESI and then please send to Alejandro Morris to schedule

## 2023-12-04 NOTE — TELEPHONE ENCOUNTER
S/W pt. Advised pt of the same. Pt verbalized understanding. Pt agreeable with FQ recommendations for PRO: L5 LESI & would like to proceed with scheduling PRO.

## 2024-01-11 ENCOUNTER — HOSPITAL ENCOUNTER (OUTPATIENT)
Dept: RADIOLOGY | Facility: CLINIC | Age: 89
End: 2024-01-11
Payer: COMMERCIAL

## 2024-01-11 VITALS
TEMPERATURE: 97.4 F | DIASTOLIC BLOOD PRESSURE: 71 MMHG | OXYGEN SATURATION: 95 % | SYSTOLIC BLOOD PRESSURE: 141 MMHG | HEART RATE: 68 BPM | RESPIRATION RATE: 20 BRPM

## 2024-01-11 DIAGNOSIS — M54.16 LUMBAR RADICULOPATHY: ICD-10-CM

## 2024-01-11 PROCEDURE — 62323 NJX INTERLAMINAR LMBR/SAC: CPT | Performed by: ANESTHESIOLOGY

## 2024-01-11 RX ORDER — METHYLPREDNISOLONE ACETATE 80 MG/ML
80 INJECTION, SUSPENSION INTRA-ARTICULAR; INTRALESIONAL; INTRAMUSCULAR; PARENTERAL; SOFT TISSUE ONCE
Status: COMPLETED | OUTPATIENT
Start: 2024-01-11 | End: 2024-01-11

## 2024-01-11 RX ORDER — BUPIVACAINE HCL/PF 2.5 MG/ML
2 VIAL (ML) INJECTION ONCE
Status: COMPLETED | OUTPATIENT
Start: 2024-01-11 | End: 2024-01-11

## 2024-01-11 RX ADMIN — METHYLPREDNISOLONE ACETATE 80 MG: 80 INJECTION, SUSPENSION INTRA-ARTICULAR; INTRALESIONAL; INTRAMUSCULAR; PARENTERAL; SOFT TISSUE at 14:14

## 2024-01-11 RX ADMIN — BUPIVACAINE HYDROCHLORIDE 2 ML: 2.5 INJECTION, SOLUTION EPIDURAL; INFILTRATION; INTRACAUDAL at 14:14

## 2024-01-11 RX ADMIN — IOHEXOL 1 ML: 300 INJECTION, SOLUTION INTRAVENOUS at 14:13

## 2024-01-11 NOTE — DISCHARGE INSTR - LAB
Epidural Steroid Injection   WHAT YOU NEED TO KNOW:   An epidural steroid injection (KARI) is a procedure to inject steroid medicine into the epidural space. The epidural space is between your spinal cord and vertebrae. Steroids reduce inflammation and fluid buildup in your spine that may be causing pain. You may be given pain medicine along with the steroids.          ACTIVITY  Do not drive or operate machinery today.  No strenuous activity today - bending, lifting, etc.  You may resume normal activites starting tomorrow - start slowly and as tolerated.  You may shower today, but no tub baths or hot tubs.  You may have numbness for several hours from the local anesthetic. Please use caution and common sense, especially with weight-bearing activities.    CARE OF THE INJECTION SITE  If you have soreness or pain, apply ice to the area today (20 minutes on/20 minutes off).  Starting tomorrow, you may use warm, moist heat or ice if needed.  You may have an increase or change in your discomfort for 36-48 hours after your treatment.  Apply ice and continue with any pain medication you have been prescribed.  Notify the [facility] if you have any of the following: redness, drainage, swelling, headache, stiff neck or fever above 100°F.    SPECIAL INSTRUCTIONS  Our office will contact you in approximately 7 days for a progress report.    MEDICATIONS  Continue to take all routine medications.  Our office may have instructed you to hold some medications.    As no general anesthesia was used in today's procedure, you should not experience any side effects related to anesthesia.     If you are diabetic, the steroids used in today's injection may temporarily increase your blood sugar levels after the first few days after your injection. Please keep a close eye on your sugars and alert the doctor who manages your diabetes if your sugars are significantly high from your baseline or you are symptomatic.     If you have a  problem specifically related to your procedure, please call our office at [phone].  Problems not related to your procedure should be directed to your primary care physician.

## 2024-01-11 NOTE — H&P
History of Present Illness: The patient is a 88 y.o. female who presents with complaints of lower back and leg pain and is here today for a lumbar epidural steroid injection    No past medical history on file.    No past surgical history on file.      Current Outpatient Medications:     amLODIPine (NORVASC) 10 mg tablet, Take 10 mg by mouth daily, Disp: , Rfl:     aspirin (ECOTRIN LOW STRENGTH) 81 mg EC tablet, Take 1 tablet by mouth if needed , Disp: , Rfl:     atorvastatin (LIPITOR) 40 mg tablet, Take 1 tablet by mouth every evening, Disp: , Rfl:     celecoxib (CeleBREX) 200 mg capsule, Take 200 mg by mouth 2 (two) times a day, Disp: , Rfl:     cyanocobalamin (VITAMIN B-12) 1000 MCG tablet, Take 1 tablet by mouth daily, Disp: , Rfl:     hydrochlorothiazide (HYDRODIURIL) 25 mg tablet, Take 25 mg by mouth daily, Disp: , Rfl:     metoprolol tartrate (LOPRESSOR) 25 mg tablet, Take 25 mg by mouth 2 (two) times a day, Disp: , Rfl:     traMADol (Ultram) 50 mg tablet, Take 1 tablet (50 mg total) by mouth daily as needed for moderate pain, Disp: 30 tablet, Rfl: 0  No current facility-administered medications for this encounter.    No Known Allergies    Physical Exam:   Vitals:    01/11/24 1404   BP: 136/75   Pulse: 68   Resp: 18   Temp: (!) 97.4 °F (36.3 °C)   SpO2: 94%     General: Awake, Alert, Oriented x 3, Mood and affect appropriate  Respiratory: Respirations even and unlabored  Cardiovascular: Peripheral pulses intact; no edema  Musculoskeletal Exam: Back pain    ASA Score: 3    Patient/Chart Verification  Patient ID Verified: Verbal  ID Band Applied: No  Consents Confirmed: Procedural, To be obtained in the Pre-Procedure area  Interval H&P(within 24 hr) Complete (required for Outpatients and Surgery Admit only): To be obtained in the Pre-Procedure area  Allergies Reviewed: Yes  Anticoag/NSAID held?: NA  Currently on antibiotics?: No    Assessment:   1. Lumbar radiculopathy        Plan: L5 LESI

## 2024-01-18 ENCOUNTER — TELEPHONE (OUTPATIENT)
Dept: RADIOLOGY | Facility: MEDICAL CENTER | Age: 89
End: 2024-01-18

## 2024-01-25 NOTE — TELEPHONE ENCOUNTER
Patient Reports      10   %     improvement post injection    Pain Level    9 -10/10     Patient would to get medication for the pain. She would like a call back regarding this.

## 2024-01-25 NOTE — TELEPHONE ENCOUNTER
Pt reports minimal improvement with last injection. Pt said her pain is in her L lower back and radiates into her hip/groin. Pt said pain increases with walking and is alleviated with sitting. Pt can not describe pain other than it does not burn, just hurts. Pt taking Aleve, Ibuprofen and ASA with no relief of pain. Pt asking if something else can be prescribed to help.

## 2024-02-24 ENCOUNTER — TELEPHONE (OUTPATIENT)
Dept: OTHER | Facility: OTHER | Age: 89
End: 2024-02-24

## 2024-02-24 NOTE — TELEPHONE ENCOUNTER
Patient is calling regarding cancelling an appointment.    Date/Time:2/27/2024 / 11:15 am    Patient was rescheduled: YES [] NO [x]    Patient requesting call back to reschedule: YES [] NO [x]

## 2024-04-01 ENCOUNTER — TELEPHONE (OUTPATIENT)
Age: 89
End: 2024-04-01

## 2024-04-01 NOTE — TELEPHONE ENCOUNTER
Attempted to contact pt, line continued to ring without option to leave vm. Please try to reach pt at another time.     **phone number listed in previous task is incorrect, use # from pt chart

## 2024-04-01 NOTE — TELEPHONE ENCOUNTER
Caller: [place]    Doctor: Zayra    Reason for call: Pt having pain on her right side  going down leg and was seen by ortho and given a cortizone shot to see if it helps didn't help pt had a scheduled follow up with Zayra for 5/13 rescheduled pt with Ondina for sooner date but pt would like to know if there is anything that can be given to her in the meantime for the pain because the Celebrex she was given doesn't help.     Please advise    Call back#: [phone]

## 2024-04-02 NOTE — TELEPHONE ENCOUNTER
S/w Pt, Pt reports pain 10/10 in R hip radiating all the way down to the foot. Pt has OV with AS on 04/10. Pt requesting anything to help with the pain at this time. Pt states celebrex/ibuprofen does not provide any relief. Pt previously prescribed 30 tablet prescription of oxycodone 5mg daily PRN and states that was the only thing that helped her pain. Please review and advise.

## 2024-04-02 NOTE — TELEPHONE ENCOUNTER
A short prescription of Percocet was sent to the patient's pharmacy.  Will follow-up at her next office visit

## 2024-04-10 ENCOUNTER — OFFICE VISIT (OUTPATIENT)
Dept: PAIN MEDICINE | Facility: CLINIC | Age: 89
End: 2024-04-10
Payer: COMMERCIAL

## 2024-04-10 VITALS
WEIGHT: 163 LBS | DIASTOLIC BLOOD PRESSURE: 72 MMHG | SYSTOLIC BLOOD PRESSURE: 148 MMHG | HEART RATE: 70 BPM | RESPIRATION RATE: 16 BRPM | HEIGHT: 61 IN | BODY MASS INDEX: 30.78 KG/M2

## 2024-04-10 DIAGNOSIS — G89.4 CHRONIC PAIN SYNDROME: ICD-10-CM

## 2024-04-10 DIAGNOSIS — M51.16 INTERVERTEBRAL DISC DISORDER WITH RADICULOPATHY OF LUMBAR REGION: Primary | ICD-10-CM

## 2024-04-10 DIAGNOSIS — M48.062 SPINAL STENOSIS OF LUMBAR REGION WITH NEUROGENIC CLAUDICATION: ICD-10-CM

## 2024-04-10 PROCEDURE — 99214 OFFICE O/P EST MOD 30 MIN: CPT

## 2024-04-10 RX ORDER — OXYCODONE HYDROCHLORIDE 5 MG/1
TABLET ORAL
Qty: 60 TABLET | Refills: 0 | Status: SHIPPED | OUTPATIENT
Start: 2024-04-10

## 2024-04-10 NOTE — PROGRESS NOTES
Assessment:  1. Intervertebral disc disorder with radiculopathy of lumbar region    2. Chronic pain syndrome    3. Spinal stenosis of lumbar region with neurogenic claudication        Plan:  The patient is an 89-year-old female with a history of chronic pain secondary to neck pain, cervical radiculopathy, cervical spinal stenosis, left shoulder arthritis, low back pain and lumbar radiculopathy who presents to the office with worsening bilateral low back pain and pain that radiates into the right lower extremity.    At this time, I instructed patient we can repeat an L5 LESI to help decrease inflammation and provide her relief, as this procedure did provide her greater than 50% relief of her pain when it was done on 1/11/2024.  MRI of lumbar spine does show moderate to severe right L5-S1 foraminal narrowing and hitting the right L5 nerve root.  Patient would like to proceed.  I instructed our  will schedule her.  Complete risks and benefits including bleeding, infection, tissue reaction, nerve injury and allergic reaction were discussed.  The approach was demonstrated using models and literature was provided.  Verbal and written consent was obtained.    I will also continue on oxycodone 5 mg that she can take twice a day as needed to provide her relief until we are able to get her scheduled for the injection.    My impressions and treatment recommendations were discussed in detail with the patient who verbalized understanding and had no further questions.  Discharge instructions were provided. I personally saw and examined the patient and I agree with the above discussed plan of care.    Orders Placed This Encounter   Procedures    [place] [facility] and pain procedure     Dr Iverson     Standing Status:   Future     Standing Expiration Date:   4/10/2028     Order Specific Question:   Reason for Exam:     Answer:   L5 LESI     Order Specific Question:   Anticoagulant hold needed?     Answer:   No     New Medications  Ordered This Visit   Medications    oxyCODONE (Roxicodone) 5 immediate release tablet     Sig: Take 1 tablet BID prn pain     Dispense:  60 tablet     Refill:  0       History of Present Illness:  Christa Peres is a 89 y.o. female with a history of chronic pain secondary to neck pain, cervical radiculopathy, cervical spinal stenosis, left shoulder arthritis, low back pain and lumbar radiculopathy.  She was last seen on 1/11/2024 where she underwent an L5 LESI which did provide her greater than 50% relief of her pain for 3 to 4 weeks.  She presents to the office with worsening bilateral low back pain and pain that radiates into the right lower extremity.  Patient is also complaining of swelling in her right ankle.    She states her pain is worse since the last office visit and constant.  She rates quality of her pain as a cramping, shooting and is currently rating her pain a 10/10 on a numeric scale.    Current pain medications include Aleve as needed which is mildly helpful.  She was also prescribed a short prescription for oxycodone 5 mg which she takes daily which is more helpful.    I have personally reviewed and/or updated the patient's past medical history, past surgical history, family history, social history, current medications, allergies, and vital signs today.     Review of Systems   Respiratory:  Negative for shortness of breath.    Cardiovascular:  Negative for chest pain.   Gastrointestinal:  Negative for constipation, diarrhea, nausea and vomiting.   Musculoskeletal:  Positive for back pain, gait problem and joint swelling. Negative for arthralgias and myalgias.        RLE Pain   Skin:  Negative for rash.   Neurological:  Positive for weakness. Negative for dizziness and seizures.   All other systems reviewed and are negative.      Patient Active Problem List   Diagnosis    Cervical radiculopathy    Essential hypertension    Gastroesophageal reflux disease without esophagitis    H/O total knee  "replacement, right    Hyperlipidemia    Left shoulder pain    Prolonged QT interval    Mild intermittent asthma without complication    Nontraumatic complete tear of left rotator cuff    Chronic pain syndrome    Cervical disc disorder with radiculopathy of mid-cervical region    Sacroiliitis (HCC)    Bursitis of left shoulder    Lumbar radiculopathy       History reviewed. No pertinent past medical history.    History reviewed. No pertinent surgical history.    History reviewed. No pertinent family history.    Social History     Occupational History    Not on file   Tobacco Use    Smoking status: Not on file    Smokeless tobacco: Not on file   Substance and Sexual Activity    Alcohol use: Not on file    Drug use: Not on file    Sexual activity: Not on file       Current Outpatient Medications on File Prior to Visit   Medication Sig    amLODIPine (NORVASC) 10 mg tablet Take 10 mg by mouth daily    aspirin (ECOTRIN LOW STRENGTH) 81 mg EC tablet Take 1 tablet by mouth if needed     atorvastatin (LIPITOR) 40 mg tablet Take 1 tablet by mouth every evening    celecoxib (CeleBREX) 200 mg capsule Take 200 mg by mouth 2 (two) times a day    cyanocobalamin (VITAMIN B-12) 1000 MCG tablet Take 1 tablet by mouth daily    hydrochlorothiazide (HYDRODIURIL) 25 mg tablet Take 25 mg by mouth daily    metoprolol tartrate (LOPRESSOR) 25 mg tablet Take 25 mg by mouth 2 (two) times a day    [DISCONTINUED] oxyCODONE (Roxicodone) 5 immediate release tablet Take 1 tablet (5 mg total) by mouth daily as needed for moderate pain Max Daily Amount: 5 mg     No current facility-administered medications on file prior to visit.       No Known Allergies    Physical Exam:    /72   Pulse 70   Resp 16   Ht 5' 1\" (1.549 m)   Wt 73.9 kg (163 lb)   BMI 30.80 kg/m²     Constitutional:normal, well developed, well nourished, alert, in no distress and non-toxic and no overt pain behavior.  Eyes:anicteric  HEENT:grossly intact  Neck:supple, " symmetric, trachea midline and no masses   Pulmonary:even and unlabored  Cardiovascular:No edema or pitting edema present  Skin:Normal without rashes or lesions and well hydrated  Psychiatric:Mood and affect appropriate  Neurologic:Cranial Nerves II-XII grossly intact  Musculoskeletal:antalgic and ambulates with a roller walker    Lumbar Spine Exam    Appearance:  Normal lordosis  Palpation/Tenderness:  left lumbar paraspinal tenderness  right lumbar paraspinal tenderness  Sensory:  no sensory deficits noted  Range of Motion:  Flexion:  Minimally limited  with pain  Extension:  Minimally limited  with pain  Motor Strength:  Left hip flexion:  5/5  Right hip flexion:  5/5  Left knee flexion:  5/5  Left knee extension:  5/5  Right knee flexion:  5/5  Right knee extension:  5/5  Left foot dorsiflexion:  5/5  Left foot plantar flexion:  5/5  Right foot dorsiflexion:  5/5  Right foot plantar flexion:  5/5      Imaging

## 2024-04-18 ENCOUNTER — HOSPITAL ENCOUNTER (OUTPATIENT)
Dept: RADIOLOGY | Facility: CLINIC | Age: 89
Discharge: HOME | End: 2024-04-18
Admitting: ANESTHESIOLOGY
Payer: COMMERCIAL

## 2024-04-18 VITALS
DIASTOLIC BLOOD PRESSURE: 68 MMHG | HEART RATE: 73 BPM | OXYGEN SATURATION: 93 % | SYSTOLIC BLOOD PRESSURE: 111 MMHG | RESPIRATION RATE: 18 BRPM | TEMPERATURE: 97.7 F

## 2024-04-18 DIAGNOSIS — M51.16 INTERVERTEBRAL DISC DISORDER WITH RADICULOPATHY OF LUMBAR REGION: ICD-10-CM

## 2024-04-18 DIAGNOSIS — F41.9 ANXIETY: Primary | ICD-10-CM

## 2024-04-18 PROCEDURE — 62323 NJX INTERLAMINAR LMBR/SAC: CPT | Performed by: ANESTHESIOLOGY

## 2024-04-18 RX ORDER — BUPIVACAINE HCL/PF 2.5 MG/ML
2 VIAL (ML) INJECTION ONCE
Status: COMPLETED | OUTPATIENT
Start: 2024-04-18 | End: 2024-04-18

## 2024-04-18 RX ORDER — METHYLPREDNISOLONE ACETATE 80 MG/ML
80 INJECTION, SUSPENSION INTRA-ARTICULAR; INTRALESIONAL; INTRAMUSCULAR; PARENTERAL; SOFT TISSUE ONCE
Status: COMPLETED | OUTPATIENT
Start: 2024-04-18 | End: 2024-04-18

## 2024-04-18 RX ORDER — LORAZEPAM 0.5 MG/1
0.25 TABLET ORAL DAILY
Qty: 3 TABLET | Refills: 0 | Status: SHIPPED | OUTPATIENT
Start: 2024-04-18

## 2024-04-18 RX ADMIN — BUPIVACAINE HYDROCHLORIDE 2 ML: 2.5 INJECTION, SOLUTION EPIDURAL; INFILTRATION; INTRACAUDAL at 13:59

## 2024-04-18 RX ADMIN — IOHEXOL 1 ML: 300 INJECTION, SOLUTION INTRAVENOUS at 13:59

## 2024-04-18 RX ADMIN — METHYLPREDNISOLONE ACETATE 80 MG: 80 INJECTION, SUSPENSION INTRA-ARTICULAR; INTRALESIONAL; INTRAMUSCULAR; PARENTERAL; SOFT TISSUE at 13:59

## 2024-04-18 NOTE — DISCHARGE INSTR - LAB
Epidural Steroid Injection   WHAT YOU NEED TO KNOW:   An epidural steroid injection (KARI) is a procedure to inject steroid medicine into the epidural space. The epidural space is between your spinal cord and vertebrae. Steroids reduce inflammation and fluid buildup in your spine that may be causing pain. You may be given pain medicine along with the steroids.          ACTIVITY  Do not drive or operate machinery today.  No strenuous activity today - bending, lifting, etc.  You may resume normal activites starting tomorrow - start slowly and as tolerated.  You may shower today, but no tub baths or hot tubs.  You may have numbness for several hours from the local anesthetic. Please use caution and common sense, especially with weight-bearing activities.    CARE OF THE INJECTION SITE  If you have soreness or pain, apply ice to the area today (20 minutes on/20 minutes off).  Starting tomorrow, you may use warm, moist heat or ice if needed.  You may have an increase or change in your discomfort for 36-48 hours after your treatment.  Apply ice and continue with any pain medication you have been prescribed.  Notify the [facility] if you have any of the following: redness, drainage, swelling, headache, stiff neck or fever above 100°F.    SPECIAL INSTRUCTIONS  Our office will contact you in approximately 7 days for a progress report.    MEDICATIONS  Continue to take all routine medications.  Our office may have instructed you to hold some medications.    As no general anesthesia was used in today's procedure, you should not experience any side effects related to anesthesia.     If you are diabetic, the steroids used in today's injection may temporarily increase your blood sugar levels after the first few days after your injection. Please keep a close eye on your sugars and alert the doctor who manages your diabetes if your sugars are significantly high from your baseline or you are symptomatic.     If you have a  problem specifically related to your procedure, please call our office at [phone].  Problems not related to your procedure should be directed to your primary care physician.

## 2024-04-18 NOTE — H&P
History of Present Illness: The patient is a 89 y.o. female who presents with complaints of lower back pain is here today for a lumbar epidural steroid injection    No past medical history on file.    No past surgical history on file.      Current Outpatient Medications:     amLODIPine (NORVASC) 10 mg tablet, Take 10 mg by mouth daily, Disp: , Rfl:     aspirin (ECOTRIN LOW STRENGTH) 81 mg EC tablet, Take 1 tablet by mouth if needed , Disp: , Rfl:     atorvastatin (LIPITOR) 40 mg tablet, Take 1 tablet by mouth every evening, Disp: , Rfl:     celecoxib (CeleBREX) 200 mg capsule, Take 200 mg by mouth 2 (two) times a day, Disp: , Rfl:     cyanocobalamin (VITAMIN B-12) 1000 MCG tablet, Take 1 tablet by mouth daily, Disp: , Rfl:     hydrochlorothiazide (HYDRODIURIL) 25 mg tablet, Take 25 mg by mouth daily, Disp: , Rfl:     metoprolol tartrate (LOPRESSOR) 25 mg tablet, Take 25 mg by mouth 2 (two) times a day, Disp: , Rfl:     oxyCODONE (Roxicodone) 5 immediate release tablet, Take 1 tablet BID prn pain, Disp: 60 tablet, Rfl: 0    Current Facility-Administered Medications:     bupivacaine (PF) (MARCAINE) 0.25 % injection 2 mL, 2 mL, Epidural, Once, Derek Iverson MD    iohexol (OMNIPAQUE) 300 mg/mL injection 1 mL, 1 mL, Epidural, Once, Derek Iverson MD    methylPREDNISolone acetate (DEPO-MEDROL) injection 80 mg, 80 mg, Epidural, Once, Derek Iverson MD    No Known Allergies    Physical Exam:   Vitals:    04/18/24 1338   BP: 115/67   Pulse: 71   Resp: 18   Temp: 97.7 °F (36.5 °C)   SpO2: 95%     General: Awake, Alert, Oriented x 3, Mood and affect appropriate  Respiratory: Respirations even and unlabored  Cardiovascular: Peripheral pulses intact; no edema  Musculoskeletal Exam: Lower back pain    ASA Score: 3    Patient/Chart Verification  Patient ID Verified: Verbal  ID Band Applied: No  Consents Confirmed: To be obtained in the Pre-Procedure area, Procedural  H&P( within 30 days) Verified: To be obtained in the  Pre-Procedure area  Allergies Reviewed: Yes  Anticoag/NSAID held?: No  Currently on antibiotics?: No    Assessment:   1. Intervertebral disc disorder with radiculopathy of lumbar region        Plan: L5 LESI

## 2024-04-25 ENCOUNTER — TELEPHONE (OUTPATIENT)
Dept: RADIOLOGY | Facility: MEDICAL CENTER | Age: 89
End: 2024-04-25

## 2024-04-25 DIAGNOSIS — G89.4 CHRONIC PAIN SYNDROME: ICD-10-CM

## 2024-05-02 NOTE — TELEPHONE ENCOUNTER
Patient reports slight improvement post inj, not much difference from last week.  Pain level 7-8/10

## 2024-05-09 RX ORDER — OXYCODONE HYDROCHLORIDE 5 MG/1
TABLET ORAL
Qty: 60 TABLET | Refills: 0 | Status: SHIPPED | OUTPATIENT
Start: 2024-05-09

## 2024-05-09 NOTE — TELEPHONE ENCOUNTER
Pt said she still has pain mostly on her left LB, less on the right LB.  She reports the Rt leg pain she had before the procedure is getting better.   Lt LB pain level 8-9/10. Pain is worse with standing and walking, lessens when she sits or lays down.     Pt is asking for another oxycodone Rx. Ondina prescribed #60 on 4/10/24 to help w/ pain until inj. Pt said she takes 2 per day and has about 10-12 pills left. Pt uses [facility] on file if you are willing to send another Rx.

## 2024-05-09 NOTE — TELEPHONE ENCOUNTER
Prescription for the oxycodone sent.  She will need follow-up in 4 weeks with Ondina for further refills.  She may have sacroiliac pain that is still causing her trouble as well which Ondina will reevaluate at her upcoming office visit

## 2024-05-09 NOTE — TELEPHONE ENCOUNTER
Patient Reports     same as last week     %     improvement post injection    Patient is asking for  a refill on her oxycodone. Please call the patient back

## 2024-06-03 ENCOUNTER — NEW PATIENT (OUTPATIENT)
Dept: URBAN - METROPOLITAN AREA CLINIC 6 | Facility: CLINIC | Age: 89
End: 2024-06-03

## 2024-06-03 DIAGNOSIS — H52.203: ICD-10-CM

## 2024-06-03 DIAGNOSIS — H01.013: ICD-10-CM

## 2024-06-03 DIAGNOSIS — H01.016: ICD-10-CM

## 2024-06-03 DIAGNOSIS — H25.813: ICD-10-CM

## 2024-06-03 DIAGNOSIS — H52.13: ICD-10-CM

## 2024-06-03 PROCEDURE — 92004 COMPRE OPH EXAM NEW PT 1/>: CPT

## 2024-06-03 PROCEDURE — 92015 DETERMINE REFRACTIVE STATE: CPT

## 2024-06-03 ASSESSMENT — VISUAL ACUITY
OS_SC: 20/200
OS_CC: J5
OD_CC: 20/400

## 2024-06-03 ASSESSMENT — TONOMETRY
OD_IOP_MMHG: 14
OS_IOP_MMHG: 14

## 2024-06-06 ENCOUNTER — OFFICE VISIT (OUTPATIENT)
Dept: PAIN MEDICINE | Facility: CLINIC | Age: 89
End: 2024-06-06
Payer: COMMERCIAL

## 2024-06-06 VITALS
SYSTOLIC BLOOD PRESSURE: 137 MMHG | WEIGHT: 164 LBS | HEART RATE: 69 BPM | BODY MASS INDEX: 30.96 KG/M2 | DIASTOLIC BLOOD PRESSURE: 69 MMHG | HEIGHT: 61 IN | RESPIRATION RATE: 18 BRPM

## 2024-06-06 DIAGNOSIS — M51.16 INTERVERTEBRAL DISC DISORDER WITH RADICULOPATHY OF LUMBAR REGION: ICD-10-CM

## 2024-06-06 DIAGNOSIS — M46.1 SACROILIITIS (HCC): Primary | ICD-10-CM

## 2024-06-06 DIAGNOSIS — G89.29 CHRONIC LEFT SHOULDER PAIN: ICD-10-CM

## 2024-06-06 DIAGNOSIS — M25.512 CHRONIC LEFT SHOULDER PAIN: ICD-10-CM

## 2024-06-06 DIAGNOSIS — G89.4 CHRONIC PAIN SYNDROME: ICD-10-CM

## 2024-06-06 PROCEDURE — 99214 OFFICE O/P EST MOD 30 MIN: CPT

## 2024-06-06 RX ORDER — OXYCODONE HYDROCHLORIDE 5 MG/1
TABLET ORAL
Qty: 75 TABLET | Refills: 0 | Status: SHIPPED | OUTPATIENT
Start: 2024-06-07

## 2024-06-06 RX ORDER — POTASSIUM CHLORIDE 20 MEQ/1
20 TABLET, EXTENDED RELEASE ORAL DAILY
COMMUNITY
Start: 2024-05-19

## 2024-06-06 RX ORDER — OXYCODONE HYDROCHLORIDE 5 MG/1
TABLET ORAL
Qty: 75 TABLET | Refills: 0 | Status: SHIPPED | OUTPATIENT
Start: 2024-07-05

## 2024-06-06 NOTE — PROGRESS NOTES
Assessment:  1. Sacroiliitis (HCC)    2. Chronic left shoulder pain    3. Chronic pain syndrome    4. Intervertebral disc disorder with radiculopathy of lumbar region        Plan:  The patient is an 89-year-old female with a history of chronic pain secondary to neck pain, cervical radiculopathy, cervical spinal stenosis, left shoulder arthritis, low back pain and lumbar radiculopathy who presents to the office with ongoing bilateral low back pain, left worse than right and left shoulder pain.    On exam, the patient is tender with palpation over bilateral SI joints as well as having positive provocative maneuvers for sacroiliitis.  I instructed patient we can perform bilateral SI joint injections to decrease inflammation and provide them relief.  Patient would like to proceed.  I instructed our  will schedule them.  Complete risks and benefits including bleeding, infection, tissue reaction, nerve injury and allergic reaction were discussed.  The approach was demonstrated using models and literature was provided.  Verbal and written consent was obtained.    I also instructed patient we can repeat a left subacromial bursa injection to help with her left shoulder pain.  She last had this procedure done on 11/6/2023 which provided her greater than 50% relief of her left shoulder pain.  Patient would like to proceed and will be scheduled.    Overall her pain continues to be managed with taking oxycodone, therefore I will continue her on this medication as prescribed, however I will increase the dose to 7.5 mg for better efficacy.  2 months prescription for oxycodone 5 mg with instructions to take 1.5 tablets twice a day were electronically sent to the patient's pharmacy with do not fill dates of 6/7/2024 and 7/5/2024.    An opiate contract will be obtained at next office visit.    The patient was also completed a BECKS depression inventory and SOAPP-R  today, as part of our yearly opioid management  program.    There are risks associated with opioid medications, including dependence, addiction and tolerance. The patient understands and agrees to use these medications only as prescribed. Potential side effects of the medications include, but are not limited to, constipation, drowsiness, addiction, impaired judgment and risk of fatal overdose if not taken as prescribed. The patient was warned against driving while taking sedation medications.  Sharing medications is a felony. At this point in time, the patient is showing no signs of addiction, abuse, diversion or suicidal ideation.    [place] Prescription Drug Monitoring Program report was reviewed and was appropriate     My impressions and treatment recommendations were discussed in detail with the patient who verbalized understanding and had no further questions.  Discharge instructions were provided. I personally saw and examined the patient and I agree with the above discussed plan of care.    Orders Placed This Encounter   Procedures    [facility] pain procedure     Dr Zayra Bonner Status:   Future     Standing Expiration Date:   6/6/2028     Order Specific Question:   Reason for Exam:     Answer:   Bilateral SI joint injections     Order Specific Question:   Anticoagulant hold needed?     Answer:   No     New Medications Ordered This Visit   Medications    potassium chloride (Klor-Con M20) 20 mEq tablet     Sig: Take 20 mEq by mouth daily    oxyCODONE (Roxicodone) 5 immediate release tablet     Sig: Take 1.5 tablet BID prn pain Do not start before July 5, 2024.     Dispense:  75 tablet     Refill:  0    oxyCODONE (Roxicodone) 5 immediate release tablet     Sig: Take 1.5 tablet BID prn pain Do not start before June 7, 2024.     Dispense:  75 tablet     Refill:  0       History of Present Illness:  Christa Peres is a 89 y.o. female with a history of chronic pain secondary to neck pain, cervical radiculopathy, cervical spinal stenosis, left  shoulder arthritis, low back pain and lumbar radiculopathy.  She was last seen on 4/18/2024 where she underwent an L5 LESI which did provide her greater than than 50% relief of her radiating pain.  She presents to the office with ongoing bilateral low back pain, left worse than right and left shoulder pain.    She states her pain is the same since the last office visit and intermittent.  She states her pain is worse with walking.  She rates quality of her pain as dull/aching, cramping and is currently rating her pain a 10/10 numeric scale.    Current pain medications include oxycodone 5 mg twice a day as needed which is helpful and Aleve which she takes sparingly.  She states this medication regimen is providing her 25% relief of her pain without side effects.    I have personally reviewed and/or updated the patient's past medical history, past surgical history, family history, social history, current medications, allergies, and vital signs today.     Review of Systems   Respiratory:  Negative for shortness of breath.    Cardiovascular:  Negative for chest pain.   Gastrointestinal:  Negative for constipation, diarrhea, nausea and vomiting.   Musculoskeletal:  Positive for back pain and gait problem. Negative for arthralgias, joint swelling and myalgias.        Decreased Range Of Motion  Bilateral Swelling feet and Ankles  Pain Bilateral Hands and Feet   Skin:  Negative for rash.   Neurological:  Negative for dizziness, seizures and weakness.   All other systems reviewed and are negative.      Patient Active Problem List   Diagnosis    Cervical radiculopathy    Essential hypertension    Gastroesophageal reflux disease without esophagitis    H/O total knee replacement, right    Hyperlipidemia    Left shoulder pain    Prolonged QT interval    Mild intermittent asthma without complication    Nontraumatic complete tear of left rotator cuff    Chronic pain syndrome    Cervical disc disorder with radiculopathy of  "mid-cervical region    Sacroiliitis (HCC)    Bursitis of left shoulder    Intervertebral disc disorder with radiculopathy of lumbar region       History reviewed. No pertinent past medical history.    History reviewed. No pertinent surgical history.    History reviewed. No pertinent family history.    Social History     Occupational History    Not on file   Tobacco Use    Smoking status: Never    Smokeless tobacco: Not on file   Vaping Use    Vaping status: Never Used   Substance and Sexual Activity    Alcohol use: Never    Drug use: Never    Sexual activity: Never       Current Outpatient Medications on File Prior to Visit   Medication Sig    amLODIPine (NORVASC) 10 mg tablet Take 10 mg by mouth daily    aspirin (ECOTRIN LOW STRENGTH) 81 mg EC tablet Take 1 tablet by mouth if needed     atorvastatin (LIPITOR) 40 mg tablet Take 1 tablet by mouth every evening    celecoxib (CeleBREX) 200 mg capsule Take 200 mg by mouth 2 (two) times a day    cyanocobalamin (VITAMIN B-12) 1000 MCG tablet Take 1 tablet by mouth daily    hydrochlorothiazide (HYDRODIURIL) 25 mg tablet Take 25 mg by mouth daily    LORazepam (ATIVAN) 0.5 mg tablet Take 0.5 tablets (0.25 mg total) by mouth in the morning    metoprolol tartrate (LOPRESSOR) 25 mg tablet Take 25 mg by mouth 2 (two) times a day    potassium chloride (Klor-Con M20) 20 mEq tablet Take 20 mEq by mouth daily    [DISCONTINUED] oxyCODONE (Roxicodone) 5 immediate release tablet Take 1 tablet BID prn pain     No current facility-administered medications on file prior to visit.       No Known Allergies    Physical Exam:    /69   Pulse 69   Resp 18   Ht 5' 1\" (1.549 m)   Wt 74.4 kg (164 lb)   BMI 30.99 kg/m²     Constitutional:normal, well developed, well nourished, alert, in no distress and non-toxic and no overt pain behavior.  Eyes:anicteric  HEENT:grossly intact  Neck:supple, symmetric, trachea midline and no masses   Pulmonary:even and unlabored  Cardiovascular:No edema " or pitting edema present  Skin:Normal without rashes or lesions and well hydrated  Psychiatric:Mood and affect appropriate  Neurologic:Cranial Nerves II-XII grossly intact  Musculoskeletal:antalgic and ambulates with a roller walker    Lumbar Spine Exam    Appearance:  Normal lordosis  Palpation/Tenderness:  left sacroiliac joint tenderness  right sacroiliac joint tenderness  Sensory:  no sensory deficits noted  Range of Motion:  Flexion:  Minimally limited  with pain  Extension:  Minimally limited  with pain  Motor Strength:  Left hip flexion:  5/5  Right hip flexion:  5/5  Left knee flexion:  5/5  Left knee extension:  5/5  Right knee flexion:  5/5  Right knee extension:  5/5  Left foot dorsiflexion:  5/5  Left foot plantar flexion:  5/5  Right foot dorsiflexion:  5/5  Right foot plantar flexion:  5/5  Special Tests:  Left Straight Leg Test:  positive  Right Straight Leg Test:  positive  Left Jose Guadalupe's Maneuver:  positive  Right Jose Guadalupe's Maneuver:  positive  Left Gaenslen's Test:  positive  Right Gaenslen's Test:  positive  Left Pelvic Distraction Test:  positive  Right Pelvic Distraction Test:  positive      Imaging

## 2024-06-06 NOTE — PATIENT INSTRUCTIONS

## 2024-06-28 ENCOUNTER — RX CHECK (OUTPATIENT)
Dept: URBAN - METROPOLITAN AREA CLINIC 6 | Facility: CLINIC | Age: 89
End: 2024-06-28

## 2024-06-28 DIAGNOSIS — H52.13: ICD-10-CM

## 2024-06-28 PROCEDURE — 92015 DETERMINE REFRACTIVE STATE: CPT | Mod: NC

## 2024-06-28 ASSESSMENT — VISUAL ACUITY
OD_CC: CF 3FT
OS_CC: CF 4FT

## 2024-07-01 ENCOUNTER — PROCEDURE VISIT (OUTPATIENT)
Dept: PAIN MEDICINE | Facility: CLINIC | Age: 89
End: 2024-07-01
Payer: COMMERCIAL

## 2024-07-01 DIAGNOSIS — M25.512 CHRONIC LEFT SHOULDER PAIN: Primary | ICD-10-CM

## 2024-07-01 DIAGNOSIS — G89.29 CHRONIC LEFT SHOULDER PAIN: Primary | ICD-10-CM

## 2024-07-01 PROCEDURE — 20611 DRAIN/INJ JOINT/BURSA W/US: CPT | Performed by: ANESTHESIOLOGY

## 2024-07-01 RX ORDER — BUPIVACAINE HYDROCHLORIDE 2.5 MG/ML
10 INJECTION, SOLUTION EPIDURAL; INFILTRATION; INTRACAUDAL ONCE
Status: COMPLETED | OUTPATIENT
Start: 2024-07-01 | End: 2024-07-01

## 2024-07-01 RX ORDER — METHYLPREDNISOLONE ACETATE 40 MG/ML
40 INJECTION, SUSPENSION INTRA-ARTICULAR; INTRALESIONAL; INTRAMUSCULAR; SOFT TISSUE ONCE
Status: COMPLETED | OUTPATIENT
Start: 2024-07-01 | End: 2024-07-01

## 2024-07-01 RX ADMIN — METHYLPREDNISOLONE ACETATE 40 MG: 40 INJECTION, SUSPENSION INTRA-ARTICULAR; INTRALESIONAL; INTRAMUSCULAR; SOFT TISSUE at 14:56

## 2024-07-01 RX ADMIN — BUPIVACAINE HYDROCHLORIDE 10 ML: 2.5 INJECTION, SOLUTION EPIDURAL; INFILTRATION; INTRACAUDAL at 14:56

## 2024-07-01 NOTE — PROGRESS NOTES
Pre-procedure Diagnosis:   1. Chronic left shoulder pain      Post-procedure Diagnosis:   1. Chronic left shoulder pain      Operation Title(s): Left subacromial bursa injection under ultrasound guidance  Attending Surgeon:   Derek Iverson MD  Anesthesia:   Local    Indications: The patient is a 89 y.o. year-old female with a diagnosis of   1. Chronic left shoulder pain    . The patient's history and physical exam were reviewed. The risks, benefits and alternatives to the procedure were discussed, and all questions were answered to the patient's satisfaction. The patient agreed to proceed, and written informed consent was obtained.    Procedure in Detail: The patient was brought into the exam room and placed in the sitting position on the exam room chair. The left shoulder was prepped with chloraprep and draped in a sterile manner.     A sterile ultrasound probe cover and gel was placed over a 3.75 MHz curvilinear transducer probe.  The probe was placed over the shoulder to visualize the subdeltoid/subacromial bursal region. Optimal needle path was determined and the skin and subcutaneous tissues in the area was anesthetized with 1% lidocaine.  Then, under ultrasound guidance, a 2 inch ultrasound needle was advanced until the needle entered the bursa region. After visualization of the tip in the target area and negative aspiration for blood, a solution consisting of 3 mL 0.25% bupivacaine and 1 mL Depo-Medrol (40mg/ml) was easily injected.    The patient's shoulder was cleansed and a bandage was placed over the sites of needle insertion.    Disposition: The patient tolerated the procedure well and there were no apparent complications.  The patient was given written discharge instructions for the procedure.

## 2024-07-15 ENCOUNTER — TELEPHONE (OUTPATIENT)
Dept: RADIOLOGY | Facility: MEDICAL CENTER | Age: 89
End: 2024-07-15

## 2024-07-16 ENCOUNTER — HOSPITAL ENCOUNTER (OUTPATIENT)
Dept: RADIOLOGY | Facility: CLINIC | Age: 89
Discharge: HOME | End: 2024-07-16
Payer: COMMERCIAL

## 2024-07-16 VITALS
HEART RATE: 69 BPM | SYSTOLIC BLOOD PRESSURE: 132 MMHG | DIASTOLIC BLOOD PRESSURE: 67 MMHG | OXYGEN SATURATION: 95 % | TEMPERATURE: 97.9 F | RESPIRATION RATE: 20 BRPM

## 2024-07-16 DIAGNOSIS — M46.1 SACROILIITIS (HCC): ICD-10-CM

## 2024-07-16 DIAGNOSIS — G89.4 CHRONIC PAIN SYNDROME: ICD-10-CM

## 2024-07-16 PROCEDURE — 27096 INJECT SACROILIAC JOINT: CPT | Performed by: ANESTHESIOLOGY

## 2024-07-16 RX ORDER — ROPIVACAINE HYDROCHLORIDE 2 MG/ML
7 INJECTION, SOLUTION EPIDURAL; INFILTRATION; PERINEURAL ONCE
Status: COMPLETED | OUTPATIENT
Start: 2024-07-16 | End: 2024-07-16

## 2024-07-16 RX ORDER — OXYCODONE HYDROCHLORIDE 5 MG/1
TABLET ORAL
Qty: 75 TABLET | Refills: 0 | Status: SHIPPED | OUTPATIENT
Start: 2024-07-16

## 2024-07-16 RX ORDER — METHYLPREDNISOLONE ACETATE 80 MG/ML
80 INJECTION, SUSPENSION INTRA-ARTICULAR; INTRALESIONAL; INTRAMUSCULAR; PARENTERAL; SOFT TISSUE ONCE
Status: COMPLETED | OUTPATIENT
Start: 2024-07-16 | End: 2024-07-16

## 2024-07-16 RX ORDER — 0.9 % SODIUM CHLORIDE 0.9 %
4 VIAL (ML) INJECTION ONCE
Status: COMPLETED | OUTPATIENT
Start: 2024-07-16 | End: 2024-07-16

## 2024-07-16 RX ADMIN — Medication 4 ML: at 09:40

## 2024-07-16 RX ADMIN — METHYLPREDNISOLONE ACETATE 80 MG: 80 INJECTION, SUSPENSION INTRA-ARTICULAR; INTRALESIONAL; INTRAMUSCULAR; PARENTERAL; SOFT TISSUE at 09:42

## 2024-07-16 RX ADMIN — IOHEXOL 2 ML: 300 INJECTION, SOLUTION INTRAVENOUS at 09:42

## 2024-07-16 RX ADMIN — ROPIVACAINE HYDROCHLORIDE 7 ML: 2 INJECTION, SOLUTION EPIDURAL; INFILTRATION; PERINEURAL at 09:42

## 2024-07-16 NOTE — H&P
History of Present Illness: The patient is a 89 y.o. female who presents with complaints of lower back pain and is here today for bilateral sacroiliac joint injections    Current Outpatient Medications:     amLODIPine (NORVASC) 10 mg tablet, Take 10 mg by mouth daily, Disp: , Rfl:     aspirin (ECOTRIN LOW STRENGTH) 81 mg EC tablet, Take 1 tablet by mouth if needed , Disp: , Rfl:     atorvastatin (LIPITOR) 40 mg tablet, Take 1 tablet by mouth every evening, Disp: , Rfl:     celecoxib (CeleBREX) 200 mg capsule, Take 200 mg by mouth 2 (two) times a day, Disp: , Rfl:     cyanocobalamin (VITAMIN B-12) 1000 MCG tablet, Take 1 tablet by mouth daily, Disp: , Rfl:     hydrochlorothiazide (HYDRODIURIL) 25 mg tablet, Take 25 mg by mouth daily, Disp: , Rfl:     LORazepam (ATIVAN) 0.5 mg tablet, Take 0.5 tablets (0.25 mg total) by mouth in the morning, Disp: 3 tablet, Rfl: 0    metoprolol tartrate (LOPRESSOR) 25 mg tablet, Take 25 mg by mouth 2 (two) times a day, Disp: , Rfl:     oxyCODONE (Roxicodone) 5 immediate release tablet, Take 1.5 tablet BID prn pain Do not start before July 5, 2024., Disp: 75 tablet, Rfl: 0    oxyCODONE (Roxicodone) 5 immediate release tablet, Take 1.5 tablet BID prn pain Do not start before June 7, 2024., Disp: 75 tablet, Rfl: 0    potassium chloride (Klor-Con M20) 20 mEq tablet, Take 20 mEq by mouth daily, Disp: , Rfl:     Current Facility-Administered Medications:     iohexol (OMNIPAQUE) 300 mg/mL injection 2 mL, 2 mL, Intra-articular, Once, Derek Iverson MD    lidocaine (PF) (XYLOCAINE-MPF) 2 % injection 4 mL, 4 mL, Infiltration, Once, Derek Iverson MD    methylPREDNISolone acetate (DEPO-MEDROL) injection 80 mg, 80 mg, Intra-articular, Once, Derek Iverson MD    ropivacaine (NAROPIN) injection 7 mL, 7 mL, Intra-articular, Once, Derek Iverson MD    sodium chloride (PF) 0.9 % injection 4 mL, 4 mL, Infiltration, Once, Derek Iverson MD    No Known Allergies    Physical Exam:   Vitals:     07/16/24 0925   BP: 135/57   Pulse: 71   Resp: 18   Temp: 97.9 °F (36.6 °C)   SpO2: 94%     General: Awake, Alert, Oriented x 3, Mood and affect appropriate  Respiratory: Respirations even and unlabored  Cardiovascular: Peripheral pulses intact; no edema  Musculoskeletal Exam: Lower back pain    ASA Score: 3    Patient/Chart Verification  Patient ID Verified: Verbal  ID Band Applied: No  Consents Confirmed: Procedural, To be obtained in the Pre-Procedure area  H&P( within 30 days) Verified: To be obtained in the Pre-Procedure area  Allergies Reviewed: Yes  Anticoag/NSAID held?: No  Currently on antibiotics?: No    Assessment:   1. Sacroiliitis (HCC)        Plan: Bilateral SI joint injections

## 2024-07-16 NOTE — DISCHARGE INSTR - LAB

## 2024-07-30 ENCOUNTER — TELEPHONE (OUTPATIENT)
Dept: RADIOLOGY | Facility: MEDICAL CENTER | Age: 89
End: 2024-07-30

## 2024-10-30 ENCOUNTER — HOSPITAL ENCOUNTER (OUTPATIENT)
Dept: RADIOLOGY | Facility: CLINIC | Age: 89
Discharge: HOME | End: 2024-10-30
Payer: COMMERCIAL

## 2024-10-30 VITALS
DIASTOLIC BLOOD PRESSURE: 70 MMHG | OXYGEN SATURATION: 95 % | SYSTOLIC BLOOD PRESSURE: 126 MMHG | TEMPERATURE: 97.8 F | RESPIRATION RATE: 18 BRPM | HEART RATE: 74 BPM

## 2024-10-30 DIAGNOSIS — M54.16 LUMBAR RADICULOPATHY: ICD-10-CM

## 2024-10-30 PROCEDURE — 62323 NJX INTERLAMINAR LMBR/SAC: CPT | Performed by: ANESTHESIOLOGY

## 2024-10-30 RX ORDER — METHYLPREDNISOLONE ACETATE 80 MG/ML
80 INJECTION, SUSPENSION INTRA-ARTICULAR; INTRALESIONAL; INTRAMUSCULAR; PARENTERAL; SOFT TISSUE ONCE
Status: COMPLETED | OUTPATIENT
Start: 2024-10-30 | End: 2024-10-30

## 2024-10-30 RX ORDER — BUPIVACAINE HCL/PF 2.5 MG/ML
2 VIAL (ML) INJECTION ONCE
Status: COMPLETED | OUTPATIENT
Start: 2024-10-30 | End: 2024-10-30

## 2024-10-30 RX ADMIN — IOHEXOL 1 ML: 300 INJECTION, SOLUTION INTRAVENOUS at 13:40

## 2024-10-30 RX ADMIN — METHYLPREDNISOLONE ACETATE 80 MG: 80 INJECTION, SUSPENSION INTRA-ARTICULAR; INTRALESIONAL; INTRAMUSCULAR; SOFT TISSUE at 13:40

## 2024-10-30 RX ADMIN — BUPIVACAINE HYDROCHLORIDE 2 ML: 2.5 INJECTION, SOLUTION EPIDURAL; INFILTRATION; INTRACAUDAL at 13:40

## 2024-10-30 NOTE — H&P
History of Present Illness: The patient is a 89 y.o. female who presents with complaints of lower back pain is here today for a lumbar epidural steroid injection    No past medical history on file.    No past surgical history on file.      Current Outpatient Medications:     amLODIPine (NORVASC) 10 mg tablet, Take 10 mg by mouth daily, Disp: , Rfl:     aspirin (ECOTRIN LOW STRENGTH) 81 mg EC tablet, Take 1 tablet by mouth if needed , Disp: , Rfl:     atorvastatin (LIPITOR) 40 mg tablet, Take 1 tablet by mouth every evening, Disp: , Rfl:     celecoxib (CeleBREX) 200 mg capsule, Take 200 mg by mouth 2 (two) times a day, Disp: , Rfl:     cyanocobalamin (VITAMIN B-12) 1000 MCG tablet, Take 1 tablet by mouth daily, Disp: , Rfl:     hydrochlorothiazide (HYDRODIURIL) 25 mg tablet, Take 25 mg by mouth daily, Disp: , Rfl:     LORazepam (ATIVAN) 0.5 mg tablet, Take 0.5 tablets (0.25 mg total) by mouth in the morning, Disp: 3 tablet, Rfl: 0    metoprolol tartrate (LOPRESSOR) 25 mg tablet, Take 25 mg by mouth 2 (two) times a day, Disp: , Rfl:     oxyCODONE (Roxicodone) 5 immediate release tablet, Take 1.5 tablet BID prn pain Do not start before June 7, 2024., Disp: 75 tablet, Rfl: 0    oxyCODONE (Roxicodone) 5 immediate release tablet, Take 1.5 tablet BID prn pain, Disp: 75 tablet, Rfl: 0    potassium chloride (Klor-Con M20) 20 mEq tablet, Take 20 mEq by mouth daily, Disp: , Rfl:     Current Facility-Administered Medications:     bupivacaine (PF) (MARCAINE) 0.25 % injection 2 mL, 2 mL, Epidural, Once, Derek Iverson MD    iohexol (OMNIPAQUE) 300 mg/mL injection 1 mL, 1 mL, Epidural, Once, Derek Iverson MD    methylPREDNISolone acetate (DEPO-MEDROL) injection 80 mg, 80 mg, Epidural, Once, Derek Iverson MD    No Known Allergies    Physical Exam:   Vitals:    10/30/24 1323   BP: 137/59   Pulse: 74   Resp: 18   Temp: 97.8 °F (36.6 °C)   SpO2: 95%     General: Awake, Alert, Oriented x 3, Mood and affect  appropriate  Respiratory: Respirations even and unlabored  Cardiovascular: Peripheral pulses intact; no edema  Musculoskeletal Exam: Lower back pain    ASA Score: 3    Patient/Chart Verification  Patient ID Verified: Verbal  ID Band Applied: No  Consents Confirmed: Procedural, To be obtained in the Pre-Procedure area  H&P( within 30 days) Verified: To be obtained in the Procedural area  Allergies Reviewed: Yes  Anticoag/NSAID held?: No  Currently on antibiotics?: No    Assessment:   1. Lumbar radiculopathy        Plan: L5 LESI

## 2024-10-30 NOTE — DISCHARGE INSTR - LAB
Epidural Steroid Injection   WHAT YOU NEED TO KNOW:   An epidural steroid injection (KARI) is a procedure to inject steroid medicine into the epidural space. The epidural space is between your spinal cord and vertebrae. Steroids reduce inflammation and fluid buildup in your spine that may be causing pain. You may be given pain medicine along with the steroids.          ACTIVITY  Do not drive or operate machinery today.  No strenuous activity today - bending, lifting, etc.  You may resume normal activites starting tomorrow - start slowly and as tolerated.  You may shower today, but no tub baths or hot tubs.  You may have numbness for several hours from the local anesthetic. Please use caution and common sense, especially with weight-bearing activities.    CARE OF THE INJECTION SITE  If you have soreness or pain, apply ice to the area today (20 minutes on/20 minutes off).  Starting tomorrow, you may use warm, moist heat or ice if needed.  You may have an increase or change in your discomfort for 36-48 hours after your treatment.  Apply ice and continue with any pain medication you have been prescribed.  Notify [facility] if you have any of the following: redness, drainage, swelling, headache, stiff neck or fever above 100°F.    SPECIAL INSTRUCTIONS  Our office will contact you in approximately 14 days for a progress report.    MEDICATIONS  Continue to take all routine medications.  Our office may have instructed you to hold some medications.    As no general anesthesia was used in today's procedure, you should not experience any side effects related to anesthesia.     If you are diabetic, the steroids used in today's injection may temporarily increase your blood sugar levels after the first few days after your injection. Please keep a close eye on your sugars and alert the doctor who manages your diabetes if your sugars are significantly high from your baseline or you are symptomatic.     If you have a  problem specifically related to your procedure, please call our office at [phone].  Problems not related to your procedure should be directed to your primary care physician.

## 2024-11-13 ENCOUNTER — TELEPHONE (OUTPATIENT)
Dept: PAIN MEDICINE | Facility: CLINIC | Age: 89
End: 2024-11-13

## 2024-11-16 ENCOUNTER — TELEPHONE (OUTPATIENT)
Dept: OTHER | Facility: OTHER | Age: 89
End: 2024-11-16

## 2024-11-16 NOTE — TELEPHONE ENCOUNTER
"Pt stated, \" I received a call from this number and I am not sure what the call was for.\"      Please follow up, thank you.  "

## 2024-11-25 ENCOUNTER — PROCEDURE VISIT (OUTPATIENT)
Dept: PAIN MEDICINE | Facility: CLINIC | Age: 89
End: 2024-11-25
Payer: COMMERCIAL

## 2024-11-25 DIAGNOSIS — G89.4 CHRONIC PAIN SYNDROME: ICD-10-CM

## 2024-11-25 DIAGNOSIS — G89.29 CHRONIC LEFT SHOULDER PAIN: Primary | ICD-10-CM

## 2024-11-25 DIAGNOSIS — M25.512 CHRONIC LEFT SHOULDER PAIN: Primary | ICD-10-CM

## 2024-11-25 PROCEDURE — 20611 DRAIN/INJ JOINT/BURSA W/US: CPT | Performed by: ANESTHESIOLOGY

## 2024-11-25 RX ORDER — OXYCODONE HYDROCHLORIDE 5 MG/1
7.5 TABLET ORAL 2 TIMES DAILY PRN
Qty: 75 TABLET | Refills: 0 | Status: SHIPPED | OUTPATIENT
Start: 2024-11-25

## 2024-11-25 RX ORDER — METHYLPREDNISOLONE ACETATE 40 MG/ML
40 INJECTION, SUSPENSION INTRA-ARTICULAR; INTRALESIONAL; INTRAMUSCULAR; SOFT TISSUE ONCE
Status: COMPLETED | OUTPATIENT
Start: 2024-11-25 | End: 2024-11-25

## 2024-11-25 RX ORDER — BUPIVACAINE HYDROCHLORIDE 2.5 MG/ML
10 INJECTION, SOLUTION INFILTRATION; PERINEURAL ONCE
Status: COMPLETED | OUTPATIENT
Start: 2024-11-25 | End: 2024-11-25

## 2024-11-25 RX ADMIN — BUPIVACAINE HYDROCHLORIDE 10 ML: 2.5 INJECTION, SOLUTION INFILTRATION; PERINEURAL at 14:57

## 2024-11-25 RX ADMIN — METHYLPREDNISOLONE ACETATE 40 MG: 40 INJECTION, SUSPENSION INTRA-ARTICULAR; INTRALESIONAL; INTRAMUSCULAR; SOFT TISSUE at 14:57

## 2024-11-25 NOTE — PROGRESS NOTES
Pre-procedure Diagnosis:   1. Chronic left shoulder pain      Post-procedure Diagnosis:   1. Chronic left shoulder pain      Operation Title(s): Left subacromial bursa injection under ultrasound guidance  Attending Surgeon:   Derek Iverson MD  Anesthesia:   Local    Indications: The patient is a 89 y.o. year-old female with a diagnosis of   1. Chronic left shoulder pain    . The patient's history and physical exam were reviewed. The risks, benefits and alternatives to the procedure were discussed, and all questions were answered to the patient's satisfaction. The patient agreed to proceed, and written informed consent was obtained.    Procedure in Detail: The patient was brought into the exam room and placed in the sitting position on the exam room chair. The left shoulder was prepped with chloraprep and draped in a sterile manner.     A sterile ultrasound probe cover and gel was placed over a 3.75 MHz curvilinear transducer probe.  The probe was placed over the shoulder to visualize the subdeltoid/subacromial bursal region. Optimal needle path was determined and the skin and subcutaneous tissues in the area was anesthetized with 1% lidocaine.  Then, under ultrasound guidance, a 2 inch ultrasound needle was advanced until the needle entered the bursa region. After visualization of the tip in the target area and negative aspiration for blood, a solution consisting of 3 mL0.25% bupivacaine and 1 mL Depo-Medrol (40mg/ml) was easily injected.    The patient's shoulder was cleansed and a bandage was placed over the sites of needle insertion.    Disposition: The patient tolerated the procedure well and there were no apparent complications.  The patient was given written discharge instructions for the procedure.

## 2024-12-09 ENCOUNTER — TELEPHONE (OUTPATIENT)
Dept: RADIOLOGY | Facility: MEDICAL CENTER | Age: 89
End: 2024-12-09

## 2024-12-09 NOTE — TELEPHONE ENCOUNTER
RN s/w pt for more details regarding left LBP. She said she and FQ talked about a left LB injection when she was in for her left shoulder inj.  Pt said the pain is in the Lt LB and sometimes goes into the hip or back of the thigh. No Rt sided LBP.    Pt denies being diabetic and occ takes ASA 81 mg when needed, it is not a daily medication.    Pt asking for injection for her Lt LBP.

## 2024-12-09 NOTE — TELEPHONE ENCOUNTER
Patient Reports         75%     improvement post injection    Pain Level   can go up to 7/10     Patient is asking for an injection in her left side lower back

## 2024-12-12 NOTE — TELEPHONE ENCOUNTER
RN s/w pt and informed of the same.    Pt is agreeable to hold off on the back injection until mid Jan as recommended by FQ.   Told pt our  will call her to schedule for mid Jan.

## 2024-12-12 NOTE — TELEPHONE ENCOUNTER
Yes we did talk about it but she has had a lot of steroid between the back and her shoulder over the last year so would recommend that we hold off until mid January to repeat the back injection which would focus more towards the left.  If amenable, please send to scheduling team to schedule for mid January

## 2024-12-13 NOTE — TELEPHONE ENCOUNTER
Left message for patient to contact the scheduling office at [phone] to schedule their procedure.

## 2025-04-03 ENCOUNTER — TELEPHONE (OUTPATIENT)
Age: 89
End: 2025-04-03

## 2025-04-03 NOTE — TELEPHONE ENCOUNTER
Caller: renan [place]     Doctor: Dr. Iverson     Reason for call: pt would like to be scheduled for another injection . Please advise     Call back#: [phone]

## 2025-04-03 NOTE — TELEPHONE ENCOUNTER
Pt called asking for a injection for her left lower back. She said the pain goes down the back of the left leg to just below the knee. No Rt sided LBP or leg pain.   Pt said her last low back inj (LESI) from 10/30/24 provided 60% relief. She said the pain came back a while ago but she just kept letting it go.  Now her pain level is 10/10.    Pt confirmed she is no longer on ASA and she is not diabetic.   LOVS was 6/6/24 maranda/ Ondina.  Pt asking for inj to be done as soon as possible.

## 2025-04-04 NOTE — TELEPHONE ENCOUNTER
Patient has been scheduled for their procedure, please see Action[employer] message for additional details.

## 2025-04-09 ENCOUNTER — HOSPITAL ENCOUNTER (OUTPATIENT)
Dept: RADIOLOGY | Facility: CLINIC | Age: 89
Discharge: HOME | End: 2025-04-09
Payer: COMMERCIAL

## 2025-04-09 ENCOUNTER — TELEPHONE (OUTPATIENT)
Dept: RADIOLOGY | Facility: CLINIC | Age: 89
End: 2025-04-09

## 2025-04-09 VITALS
DIASTOLIC BLOOD PRESSURE: 53 MMHG | SYSTOLIC BLOOD PRESSURE: 159 MMHG | HEART RATE: 71 BPM | TEMPERATURE: 97.9 F | OXYGEN SATURATION: 95 % | RESPIRATION RATE: 20 BRPM

## 2025-04-09 DIAGNOSIS — M54.16 LUMBAR RADICULOPATHY: ICD-10-CM

## 2025-04-09 DIAGNOSIS — G89.4 CHRONIC PAIN SYNDROME: ICD-10-CM

## 2025-04-09 PROCEDURE — 62323 NJX INTERLAMINAR LMBR/SAC: CPT | Performed by: ANESTHESIOLOGY

## 2025-04-09 RX ORDER — METHYLPREDNISOLONE ACETATE 80 MG/ML
80 INJECTION, SUSPENSION INTRA-ARTICULAR; INTRALESIONAL; INTRAMUSCULAR; PARENTERAL; SOFT TISSUE ONCE
Status: COMPLETED | OUTPATIENT
Start: 2025-04-09 | End: 2025-04-09

## 2025-04-09 RX ORDER — OXYCODONE HYDROCHLORIDE 5 MG/1
7.5 TABLET ORAL 2 TIMES DAILY PRN
Qty: 90 TABLET | Refills: 0 | Status: SHIPPED | OUTPATIENT
Start: 2025-04-09

## 2025-04-09 RX ORDER — BUPIVACAINE HCL/PF 2.5 MG/ML
2 VIAL (ML) INJECTION ONCE
Status: COMPLETED | OUTPATIENT
Start: 2025-04-09 | End: 2025-04-09

## 2025-04-09 RX ADMIN — METHYLPREDNISOLONE ACETATE 80 MG: 80 INJECTION, SUSPENSION INTRA-ARTICULAR; INTRALESIONAL; INTRAMUSCULAR; SOFT TISSUE at 13:14

## 2025-04-09 RX ADMIN — BUPIVACAINE HYDROCHLORIDE 2 ML: 2.5 INJECTION, SOLUTION EPIDURAL; INFILTRATION; INTRACAUDAL at 13:14

## 2025-04-09 RX ADMIN — IOHEXOL 1 ML: 300 INJECTION, SOLUTION INTRAVENOUS at 13:13

## 2025-04-09 NOTE — DISCHARGE INSTR - LAB
Epidural Steroid Injection   WHAT YOU NEED TO KNOW:   An epidural steroid injection (KARI) is a procedure to inject steroid medicine into the epidural space. The epidural space is between your spinal cord and vertebrae. Steroids reduce inflammation and fluid buildup in your spine that may be causing pain. You may be given pain medicine along with the steroids.          ACTIVITY  Do not drive or operate machinery today.  No strenuous activity today - bending, lifting, etc.  You may resume normal activites starting tomorrow - start slowly and as tolerated.  You may shower today, but no tub baths or hot tubs.  You may have numbness for several hours from the local anesthetic. Please use caution and common sense, especially with weight-bearing activities.    CARE OF THE INJECTION SITE  If you have soreness or pain, apply ice to the area today (20 minutes on/20 minutes off).  Starting tomorrow, you may use warm, moist heat or ice if needed.  You may have an increase or change in your discomfort for 36-48 hours after your treatment.  Apply ice and continue with any pain medication you have been prescribed.  Notify the [facility] if you have any of the following: redness, drainage, swelling, headache, stiff neck or fever above 100°F.    SPECIAL INSTRUCTIONS  Our office will contact you in approximately 14 days for a progress report.    MEDICATIONS  Continue to take all routine medications.  Our office may have instructed you to hold some medications.    As no general anesthesia was used in today's procedure, you should not experience any side effects related to anesthesia.     If you are diabetic, the steroids used in today's injection may temporarily increase your blood sugar levels after the first few days after your injection. Please keep a close eye on your sugars and alert the doctor who manages your diabetes if your sugars are significantly high from your baseline or you are symptomatic.     If you have a  problem specifically related to your procedure, please call our office at [phone].  Problems not related to your procedure should be directed to your primary care physician.

## 2025-04-09 NOTE — TELEPHONE ENCOUNTER
Pt here for inj today and asked to have a RF form her oxycodone  5mg sent to [facility] on file. None left.     Pls notify pt once Rx is sent.

## 2025-04-09 NOTE — H&P
History of Present Illness: The patient is a 89 y.o. female who presents with complaints of lower back pain is due to an L5 epidural steroid injection    No past medical history on file.    No past surgical history on file.      Current Outpatient Medications:     amLODIPine (NORVASC) 10 mg tablet, Take 10 mg by mouth daily, Disp: , Rfl:     atorvastatin (LIPITOR) 40 mg tablet, Take 1 tablet by mouth every evening, Disp: , Rfl:     celecoxib (CeleBREX) 200 mg capsule, Take 200 mg by mouth 2 (two) times a day, Disp: , Rfl:     cyanocobalamin (VITAMIN B-12) 1000 MCG tablet, Take 1 tablet by mouth daily, Disp: , Rfl:     hydrochlorothiazide (HYDRODIURIL) 25 mg tablet, Take 25 mg by mouth daily, Disp: , Rfl:     LORazepam (ATIVAN) 0.5 mg tablet, Take 0.5 tablets (0.25 mg total) by mouth in the morning, Disp: 3 tablet, Rfl: 0    metoprolol tartrate (LOPRESSOR) 25 mg tablet, Take 25 mg by mouth 2 (two) times a day, Disp: , Rfl:     oxyCODONE (Roxicodone) 5 immediate release tablet, Take 1.5 tablet BID prn pain Do not start before June 7, 2024., Disp: 75 tablet, Rfl: 0    oxyCODONE (Roxicodone) 5 immediate release tablet, Take 1.5 tablets (7.5 mg total) by mouth 2 (two) times a day as needed for moderate pain Max Daily Amount: 15 mg, Disp: 75 tablet, Rfl: 0    potassium chloride (Klor-Con M20) 20 mEq tablet, Take 20 mEq by mouth daily, Disp: , Rfl:     Current Facility-Administered Medications:     bupivacaine (PF) (MARCAINE) 0.25 % injection 2 mL, 2 mL, Epidural, Once, Derek Iverson MD    iohexol (OMNIPAQUE) 300 mg/mL injection 1 mL, 1 mL, Epidural, Once, Derek Iverson MD    methylPREDNISolone acetate (DEPO-MEDROL) injection 80 mg, 80 mg, Epidural, Once, Derek Iverson MD    No Known Allergies    Physical Exam:   Vitals:    04/09/25 1303   BP: 160/68   Pulse: 66   Resp: 20   Temp: 97.9 °F (36.6 °C)   SpO2: 96%     General: Awake, Alert, Oriented x 3, Mood and affect appropriate  Respiratory: Respirations even and  unlabored  Cardiovascular: Peripheral pulses intact; no edema  Musculoskeletal Exam: Lower back pain    ASA Score: 3    Patient/Chart Verification  Patient ID Verified: Verbal  Consents Confirmed: To be obtained in the Procedural area  Interval H&P(within 24 hr) Complete (required for Outpatients and Surgery Admit only): To be obtained in the Procedural area  Allergies Reviewed: Yes  Anticoag/NSAID held?: NA  Currently on antibiotics?: No    Assessment:   1. Lumbar radiculopathy        Plan: L5 LESI

## 2025-04-23 ENCOUNTER — TELEPHONE (OUTPATIENT)
Dept: RADIOLOGY | Facility: MEDICAL CENTER | Age: 89
End: 2025-04-23

## 2025-05-05 ENCOUNTER — PROCEDURE VISIT (OUTPATIENT)
Dept: PAIN MEDICINE | Facility: CLINIC | Age: 89
End: 2025-05-05
Payer: COMMERCIAL

## 2025-05-05 VITALS
HEIGHT: 62 IN | BODY MASS INDEX: 30 KG/M2 | SYSTOLIC BLOOD PRESSURE: 140 MMHG | DIASTOLIC BLOOD PRESSURE: 72 MMHG | HEART RATE: 72 BPM

## 2025-05-05 DIAGNOSIS — M75.52 BURSITIS OF LEFT SHOULDER: Primary | ICD-10-CM

## 2025-05-05 DIAGNOSIS — G89.4 CHRONIC PAIN SYNDROME: ICD-10-CM

## 2025-05-05 PROCEDURE — 20611 DRAIN/INJ JOINT/BURSA W/US: CPT | Performed by: ANESTHESIOLOGY

## 2025-05-05 RX ORDER — METHYLPREDNISOLONE ACETATE 40 MG/ML
40 INJECTION, SUSPENSION INTRA-ARTICULAR; INTRALESIONAL; INTRAMUSCULAR; SOFT TISSUE ONCE
Status: COMPLETED | OUTPATIENT
Start: 2025-05-05 | End: 2025-05-05

## 2025-05-05 RX ORDER — BUPIVACAINE HYDROCHLORIDE 2.5 MG/ML
2 INJECTION, SOLUTION EPIDURAL; INFILTRATION; INTRACAUDAL; PERINEURAL ONCE
Status: COMPLETED | OUTPATIENT
Start: 2025-05-05 | End: 2025-05-05

## 2025-05-05 RX ORDER — OXYCODONE HYDROCHLORIDE 5 MG/1
7.5 TABLET ORAL 2 TIMES DAILY PRN
Qty: 90 TABLET | Refills: 0 | Status: SHIPPED | OUTPATIENT
Start: 2025-05-05

## 2025-05-05 RX ADMIN — BUPIVACAINE HYDROCHLORIDE 2 ML: 2.5 INJECTION, SOLUTION EPIDURAL; INFILTRATION; INTRACAUDAL; PERINEURAL at 14:33

## 2025-05-05 RX ADMIN — METHYLPREDNISOLONE ACETATE 40 MG: 40 INJECTION, SUSPENSION INTRA-ARTICULAR; INTRALESIONAL; INTRAMUSCULAR; SOFT TISSUE at 14:33

## 2025-05-05 NOTE — PROGRESS NOTES
Pre-procedure Diagnosis:   1. Bursitis of left shoulder    2. Chronic pain syndrome      Post-procedure Diagnosis:   1. Bursitis of left shoulder    2. Chronic pain syndrome      Operation Title(s):  1. Left shoulder intra-articular steroid injection      2. Intraoperative fluoroscopy  Attending Surgeon:   Derek Iverson MD  Anesthesia:   Local    Indications: The patient is 89 y.o. year-old female with a diagnosis of   1. Bursitis of left shoulder    2. Chronic pain syndrome    . The patient's history and physical exam were reviewed.   The risks, benefits and alternatives to the procedure were discussed, and all questions were answered to the patient's satisfaction. The patient agreed to proceed, and written informed consent was obtained.    Procedure in Detail: The patient was brought into the procedure room and placed in the supine position on the fluoroscopy table. The left shoulder was prepped with chloraprep time two and draped in a sterile manner.     AP fluoroscopy was used to identify and beverley the left glenohumeral joint. The skin and subcutaneous tissues in the area was anesthetized with 1% lidocaine. A 22-gauge, 3½-inch spinal needle was advanced toward the identified point under fluoroscopic guidance until the joint space was entered.  Then, after negative aspiration, 1-ml Omnipaque 300 contrast solution was injected showing an appropriate arthrogram. Then, a solution consisting of 3 mL 0.2% ropivacaine and ` mL Depo-Medrol (80mg/ml) was easily injected. The needle was removed with a 1% lidocaine flush.     The patient's shoulder was cleaned and a bandage was placed over the sites of needle insertion.    Disposition: The patient tolerated the procedure well and there were no apparent complications.  The patient was taken to the recovery area where written discharge instructions for the procedure were given.    Estimated Blood Loss: None  Specimens Obtained: N/A

## 2025-05-19 ENCOUNTER — TELEPHONE (OUTPATIENT)
Dept: RADIOLOGY | Facility: MEDICAL CENTER | Age: 89
End: 2025-05-19

## 2025-08-20 DIAGNOSIS — G89.4 CHRONIC PAIN SYNDROME: ICD-10-CM

## 2025-08-21 RX ORDER — OXYCODONE HYDROCHLORIDE 5 MG/1
7.5 TABLET ORAL 2 TIMES DAILY PRN
Qty: 90 TABLET | Refills: 0 | Status: SHIPPED | OUTPATIENT
Start: 2025-08-21